# Patient Record
Sex: FEMALE | Race: OTHER | Employment: FULL TIME | URBAN - METROPOLITAN AREA
[De-identification: names, ages, dates, MRNs, and addresses within clinical notes are randomized per-mention and may not be internally consistent; named-entity substitution may affect disease eponyms.]

---

## 2019-10-28 ENCOUNTER — OFFICE VISIT (OUTPATIENT)
Dept: OBGYN CLINIC | Facility: CLINIC | Age: 34
End: 2019-10-28
Payer: COMMERCIAL

## 2019-10-28 ENCOUNTER — LAB ENCOUNTER (OUTPATIENT)
Dept: LAB | Age: 34
End: 2019-10-28
Attending: OBSTETRICS & GYNECOLOGY
Payer: COMMERCIAL

## 2019-10-28 VITALS
WEIGHT: 248 LBS | BODY MASS INDEX: 43.94 KG/M2 | HEIGHT: 63 IN | SYSTOLIC BLOOD PRESSURE: 136 MMHG | DIASTOLIC BLOOD PRESSURE: 88 MMHG | HEART RATE: 110 BPM

## 2019-10-28 DIAGNOSIS — N92.6 IRREGULAR MENSES: Primary | ICD-10-CM

## 2019-10-28 DIAGNOSIS — N92.6 IRREGULAR MENSES: ICD-10-CM

## 2019-10-28 DIAGNOSIS — Z23 NEED FOR VACCINATION: ICD-10-CM

## 2019-10-28 PROCEDURE — 90471 IMMUNIZATION ADMIN: CPT | Performed by: OBSTETRICS & GYNECOLOGY

## 2019-10-28 PROCEDURE — 83002 ASSAY OF GONADOTROPIN (LH): CPT | Performed by: OBSTETRICS & GYNECOLOGY

## 2019-10-28 PROCEDURE — 84402 ASSAY OF FREE TESTOSTERONE: CPT | Performed by: OBSTETRICS & GYNECOLOGY

## 2019-10-28 PROCEDURE — 82627 DEHYDROEPIANDROSTERONE: CPT | Performed by: OBSTETRICS & GYNECOLOGY

## 2019-10-28 PROCEDURE — 90686 IIV4 VACC NO PRSV 0.5 ML IM: CPT | Performed by: OBSTETRICS & GYNECOLOGY

## 2019-10-28 PROCEDURE — 99395 PREV VISIT EST AGE 18-39: CPT | Performed by: OBSTETRICS & GYNECOLOGY

## 2019-10-28 PROCEDURE — 88175 CYTOPATH C/V AUTO FLUID REDO: CPT | Performed by: OBSTETRICS & GYNECOLOGY

## 2019-10-28 PROCEDURE — 84146 ASSAY OF PROLACTIN: CPT | Performed by: OBSTETRICS & GYNECOLOGY

## 2019-10-28 PROCEDURE — 84443 ASSAY THYROID STIM HORMONE: CPT | Performed by: OBSTETRICS & GYNECOLOGY

## 2019-10-28 PROCEDURE — 87624 HPV HI-RISK TYP POOLED RSLT: CPT | Performed by: OBSTETRICS & GYNECOLOGY

## 2019-10-28 PROCEDURE — 82670 ASSAY OF TOTAL ESTRADIOL: CPT | Performed by: OBSTETRICS & GYNECOLOGY

## 2019-10-28 PROCEDURE — 84403 ASSAY OF TOTAL TESTOSTERONE: CPT | Performed by: OBSTETRICS & GYNECOLOGY

## 2019-10-28 PROCEDURE — 83001 ASSAY OF GONADOTROPIN (FSH): CPT | Performed by: OBSTETRICS & GYNECOLOGY

## 2019-10-28 PROCEDURE — 36415 COLL VENOUS BLD VENIPUNCTURE: CPT | Performed by: OBSTETRICS & GYNECOLOGY

## 2019-10-28 PROCEDURE — 83036 HEMOGLOBIN GLYCOSYLATED A1C: CPT | Performed by: OBSTETRICS & GYNECOLOGY

## 2019-10-28 RX ORDER — MELATONIN
400 DAILY
COMMUNITY

## 2019-10-28 RX ORDER — MULTIVIT-MIN/IRON/FOLIC ACID/K 18-600-40
CAPSULE ORAL
COMMUNITY

## 2019-10-28 NOTE — PROGRESS NOTES
GYN H&P     10/28/2019  1:32 PM    CC: Patient is here for annual exam and irregular bleeding    HPI: patient is a 30year old  here for her annual gyn exam.   She reports in 2018 her menses began to become irregular.  Also reports some weight gain a Gastrointestinal: Negative for abdominal pain, blood in stool and abdominal distention. Genitourinary: Positive for menstrual problem. Negative for bladder incontinence, urgency, vaginal bleeding, vaginal discharge, breast mass and breast pain.    All oth Psychiatric: She has a normal mood and affect. Her speech is normal.          A/P: Patient is 30year old female. Here for well woman exam.   1. Irregular menses    2.  Need for vaccination        Patient Active Problem List:     Irregular menses       Rashida

## 2019-10-29 PROBLEM — N92.6 IRREGULAR MENSES: Status: ACTIVE | Noted: 2019-10-29

## 2019-11-07 ENCOUNTER — ULTRASOUND ENCOUNTER (OUTPATIENT)
Dept: OBGYN CLINIC | Facility: CLINIC | Age: 34
End: 2019-11-07
Payer: COMMERCIAL

## 2019-12-02 ENCOUNTER — TELEPHONE (OUTPATIENT)
Dept: OBGYN CLINIC | Facility: CLINIC | Age: 34
End: 2019-12-02

## 2019-12-02 ENCOUNTER — OFFICE VISIT (OUTPATIENT)
Dept: OBGYN CLINIC | Facility: CLINIC | Age: 34
End: 2019-12-02
Payer: COMMERCIAL

## 2019-12-02 VITALS
HEIGHT: 63 IN | BODY MASS INDEX: 44.09 KG/M2 | SYSTOLIC BLOOD PRESSURE: 144 MMHG | DIASTOLIC BLOOD PRESSURE: 100 MMHG | WEIGHT: 248.81 LBS

## 2019-12-02 DIAGNOSIS — E28.2 PCOS (POLYCYSTIC OVARIAN SYNDROME): ICD-10-CM

## 2019-12-02 DIAGNOSIS — R30.0 DYSURIA: Primary | ICD-10-CM

## 2019-12-02 PROCEDURE — 81002 URINALYSIS NONAUTO W/O SCOPE: CPT | Performed by: OBSTETRICS & GYNECOLOGY

## 2019-12-02 PROCEDURE — 99212 OFFICE O/P EST SF 10 MIN: CPT | Performed by: OBSTETRICS & GYNECOLOGY

## 2019-12-02 PROCEDURE — 87086 URINE CULTURE/COLONY COUNT: CPT | Performed by: OBSTETRICS & GYNECOLOGY

## 2019-12-02 NOTE — PATIENT INSTRUCTIONS
Please start metformin once daily with dinner. Please send me a "[employer] message in 2 weeks and let me know how it is going. PLEASE HAVE YOUR  GET SEMEN ANALYSIS!!! We will consider Clomid or Letrozole once we have those results returned.

## 2019-12-02 NOTE — TELEPHONE ENCOUNTER
Pt gave wrong pharmacy and wondering if rx can be re-sent to different [facility]. Metformin. Corrected pharmacy in registration.

## 2019-12-02 NOTE — PROGRESS NOTES
GYN H&P     2019  2:35 PM    CC: Patient is here for f/u of PCOS    HPI: patient is a 30year old  here for f/u of PCOS  Pt was seen with infertility and h/o irregular cycles. Labs with A1C in pre-DM range and elevated testosterone.  US with PCO avoid further workup at this time. Encouraged PO hydration and reviewed precautions. Avoid high calcium foods  - URINALYSIS NONAUTO W/O SCOPE  - URINE CULTURE, ROUTINE; Future    2. PCOS: we reviewed today in length dx and potential consequences.  We review

## 2019-12-16 ENCOUNTER — TELEPHONE (OUTPATIENT)
Dept: OBGYN CLINIC | Facility: CLINIC | Age: 34
End: 2019-12-16

## 2019-12-16 NOTE — TELEPHONE ENCOUNTER
Regarding: RE:Doctor I might refer you to  Contact: [phone]  ----- Message from Delicia Fenton RN sent at 12/16/2019 11:59 AM CST -----       ----- Message from Margie Camarena to Alexia Cordoba MD sent at 12/15/2019  4:16 PM -----   Hi Dr. Linda Medel

## 2020-02-10 ENCOUNTER — PATIENT MESSAGE (OUTPATIENT)
Dept: OBGYN CLINIC | Facility: CLINIC | Age: 35
End: 2020-02-10

## 2020-04-20 NOTE — TELEPHONE ENCOUNTER
Laurie De León MD  [facility] Nurse 18 minutes ago (5:22 PM)      Oh yes we can refill. BID, 3 month suppply. Thank you! Routing comment      Refill sent.

## 2020-04-20 NOTE — TELEPHONE ENCOUNTER
Last OV: 12/2/19 with Dr. Sameer Gant for gyn problem  Last refill date: 2/11/20  Follow-up: after completing repeat SA for ? Next appt.: none scheduled      Will consult with Dr. Sameer Gant for plan of care.

## 2021-07-15 ENCOUNTER — TELEPHONE (OUTPATIENT)
Dept: OBGYN CLINIC | Facility: CLINIC | Age: 36
End: 2021-07-15

## 2021-07-15 NOTE — TELEPHONE ENCOUNTER
Patient calling to initiate prenatal care  LMP 6/15  Patient is 7-8 weeks   Confirmation Ultrasound and Appointment scheduled on   Future Appointments   Date Time Provider Ana Maria Beard   8/10/2021  2:45 PM [facility]   8/

## 2021-07-22 NOTE — TELEPHONE ENCOUNTER
LMP: 06/15/2021    EDC based on lmp: 2022    8 wks on 08/10/2021        Are cycles regular?: Since starting Metformin her periods regulated     Medical problems: HTN    Past sx hx: Cholecystectomy 2018    Current medications: Labetalol 100 MG BI

## 2021-08-10 ENCOUNTER — ULTRASOUND ENCOUNTER (OUTPATIENT)
Dept: OBGYN CLINIC | Facility: CLINIC | Age: 36
End: 2021-08-10
Payer: COMMERCIAL

## 2021-08-10 ENCOUNTER — OFFICE VISIT (OUTPATIENT)
Dept: OBGYN CLINIC | Facility: CLINIC | Age: 36
End: 2021-08-10
Payer: COMMERCIAL

## 2021-08-10 VITALS
BODY MASS INDEX: 39.37 KG/M2 | HEIGHT: 63 IN | SYSTOLIC BLOOD PRESSURE: 124 MMHG | HEART RATE: 126 BPM | DIASTOLIC BLOOD PRESSURE: 72 MMHG | WEIGHT: 222.19 LBS

## 2021-08-10 DIAGNOSIS — N91.1 SECONDARY AMENORRHEA: Primary | ICD-10-CM

## 2021-08-10 DIAGNOSIS — O41.8X90 SUBCHORIONIC HEMATOMA, ANTEPARTUM, SINGLE OR UNSPECIFIED FETUS: ICD-10-CM

## 2021-08-10 DIAGNOSIS — Z32.01 PREGNANCY EXAMINATION OR TEST, POSITIVE RESULT: ICD-10-CM

## 2021-08-10 DIAGNOSIS — O46.8X9 SUBCHORIONIC HEMATOMA, ANTEPARTUM, SINGLE OR UNSPECIFIED FETUS: ICD-10-CM

## 2021-08-10 PROCEDURE — 76830 TRANSVAGINAL US NON-OB: CPT | Performed by: OBSTETRICS & GYNECOLOGY

## 2021-08-10 PROCEDURE — 99213 OFFICE O/P EST LOW 20 MIN: CPT | Performed by: OBSTETRICS & GYNECOLOGY

## 2021-08-10 PROCEDURE — 3078F DIAST BP <80 MM HG: CPT | Performed by: OBSTETRICS & GYNECOLOGY

## 2021-08-10 PROCEDURE — 3008F BODY MASS INDEX DOCD: CPT | Performed by: OBSTETRICS & GYNECOLOGY

## 2021-08-10 PROCEDURE — 3074F SYST BP LT 130 MM HG: CPT | Performed by: OBSTETRICS & GYNECOLOGY

## 2021-08-10 RX ORDER — VITAMIN E 268 MG
1000 CAPSULE ORAL DAILY
COMMUNITY
End: 2021-12-28

## 2021-08-10 RX ORDER — MAGNESIUM OXIDE 400 MG (241.3 MG MAGNESIUM) TABLET
400 TABLET DAILY
COMMUNITY

## 2021-08-10 RX ORDER — METFORMIN HYDROCHLORIDE 750 MG/1
750 TABLET, EXTENDED RELEASE ORAL 2 TIMES DAILY WITH MEALS
COMMUNITY
Start: 2021-07-09 | End: 2021-10-06

## 2021-08-10 RX ORDER — LABETALOL 100 MG/1
100 TABLET, FILM COATED ORAL 2 TIMES DAILY
COMMUNITY
Start: 2021-08-02 | End: 2021-11-24

## 2021-08-10 NOTE — PROGRESS NOTES
[facility]  Obstetrics and Gynecology    Subjective:     Kelsea Kunz is a 31year old  female presents with c/o secondary amenorrhea and positive pregnancy test. The patient was recommended to return for further evaluation and a pelvic US over a lot of false information the circulating in the web, and I tried to explain scientific reasons why they are false. She appreciated the conversation. All of the findings and plan were discussed with the patient.   She expressed understanding and a

## 2021-09-08 ENCOUNTER — ULTRASOUND ENCOUNTER (OUTPATIENT)
Dept: OBGYN CLINIC | Facility: CLINIC | Age: 36
End: 2021-09-08
Payer: COMMERCIAL

## 2021-09-08 ENCOUNTER — INITIAL PRENATAL (OUTPATIENT)
Dept: OBGYN CLINIC | Facility: CLINIC | Age: 36
End: 2021-09-08
Payer: COMMERCIAL

## 2021-09-08 ENCOUNTER — LAB ENCOUNTER (OUTPATIENT)
Dept: LAB | Age: 36
End: 2021-09-08
Attending: OBSTETRICS & GYNECOLOGY
Payer: COMMERCIAL

## 2021-09-08 VITALS
HEART RATE: 95 BPM | BODY MASS INDEX: 39.8 KG/M2 | WEIGHT: 224.63 LBS | DIASTOLIC BLOOD PRESSURE: 70 MMHG | SYSTOLIC BLOOD PRESSURE: 110 MMHG | HEIGHT: 63 IN

## 2021-09-08 DIAGNOSIS — O99.210 OBESITY AFFECTING PREGNANCY, ANTEPARTUM: ICD-10-CM

## 2021-09-08 DIAGNOSIS — Z34.90 SUPERVISION OF NORMAL PREGNANCY: Primary | ICD-10-CM

## 2021-09-08 DIAGNOSIS — Z34.90 PRENATAL CARE, ANTEPARTUM: Primary | ICD-10-CM

## 2021-09-08 DIAGNOSIS — Z34.90 PRENATAL CARE, ANTEPARTUM: ICD-10-CM

## 2021-09-08 LAB
ANTIBODY SCREEN: NEGATIVE
BASOPHILS # BLD AUTO: 0.02 X10(3) UL (ref 0–0.2)
BASOPHILS NFR BLD AUTO: 0.2 %
EOSINOPHIL # BLD AUTO: 0.09 X10(3) UL (ref 0–0.7)
EOSINOPHIL NFR BLD AUTO: 0.9 %
ERYTHROCYTE [DISTWIDTH] IN BLOOD BY AUTOMATED COUNT: 13.3 %
EST. AVERAGE GLUCOSE BLD GHB EST-MCNC: 105 MG/DL (ref 68–126)
GLUCOSE (URINE DIPSTICK): NEGATIVE MG/DL
GLUCOSE 1H P GLC SERPL-MCNC: 171 MG/DL
HBA1C MFR BLD HPLC: 5.3 % (ref ?–5.7)
HBV SURFACE AG SER-ACNC: <0.1 [IU]/L
HBV SURFACE AG SERPL QL IA: NONREACTIVE
HCT VFR BLD AUTO: 37.1 %
HGB BLD-MCNC: 12.2 G/DL
IMM GRANULOCYTES # BLD AUTO: 0.04 X10(3) UL (ref 0–1)
IMM GRANULOCYTES NFR BLD: 0.4 %
LYMPHOCYTES # BLD AUTO: 1.78 X10(3) UL (ref 1–4)
LYMPHOCYTES NFR BLD AUTO: 17.2 %
MCH RBC QN AUTO: 28 PG (ref 26–34)
MCHC RBC AUTO-ENTMCNC: 32.9 G/DL (ref 31–37)
MCV RBC AUTO: 85.3 FL
MONOCYTES # BLD AUTO: 0.4 X10(3) UL (ref 0.1–1)
MONOCYTES NFR BLD AUTO: 3.9 %
MULTISTIX LOT#: ABNORMAL NUMERIC
NEUTROPHILS # BLD AUTO: 8.03 X10 (3) UL (ref 1.5–7.7)
NEUTROPHILS # BLD AUTO: 8.03 X10(3) UL (ref 1.5–7.7)
NEUTROPHILS NFR BLD AUTO: 77.4 %
PLATELET # BLD AUTO: 358 10(3)UL (ref 150–450)
PROTEIN (URINE DIPSTICK): 30 MG/DL
RBC # BLD AUTO: 4.35 X10(6)UL
RH BLOOD TYPE: POSITIVE
RUBV IGG SER-ACNC: 7.8 IU/ML (ref 10–?)
T PALLIDUM AB SER QL IA: NONREACTIVE
WBC # BLD AUTO: 10.4 X10(3) UL (ref 4–11)

## 2021-09-08 PROCEDURE — 87491 CHLMYD TRACH DNA AMP PROBE: CPT | Performed by: OBSTETRICS & GYNECOLOGY

## 2021-09-08 PROCEDURE — 86900 BLOOD TYPING SEROLOGIC ABO: CPT | Performed by: OBSTETRICS & GYNECOLOGY

## 2021-09-08 PROCEDURE — 86762 RUBELLA ANTIBODY: CPT | Performed by: OBSTETRICS & GYNECOLOGY

## 2021-09-08 PROCEDURE — 87591 N.GONORRHOEAE DNA AMP PROB: CPT | Performed by: OBSTETRICS & GYNECOLOGY

## 2021-09-08 PROCEDURE — 81002 URINALYSIS NONAUTO W/O SCOPE: CPT | Performed by: OBSTETRICS & GYNECOLOGY

## 2021-09-08 PROCEDURE — 86850 RBC ANTIBODY SCREEN: CPT | Performed by: OBSTETRICS & GYNECOLOGY

## 2021-09-08 PROCEDURE — 85025 COMPLETE CBC W/AUTO DIFF WBC: CPT | Performed by: OBSTETRICS & GYNECOLOGY

## 2021-09-08 PROCEDURE — 81220 CFTR GENE COM VARIANTS: CPT | Performed by: OBSTETRICS & GYNECOLOGY

## 2021-09-08 PROCEDURE — 86780 TREPONEMA PALLIDUM: CPT | Performed by: OBSTETRICS & GYNECOLOGY

## 2021-09-08 PROCEDURE — 3078F DIAST BP <80 MM HG: CPT | Performed by: OBSTETRICS & GYNECOLOGY

## 2021-09-08 PROCEDURE — 86901 BLOOD TYPING SEROLOGIC RH(D): CPT | Performed by: OBSTETRICS & GYNECOLOGY

## 2021-09-08 PROCEDURE — 83036 HEMOGLOBIN GLYCOSYLATED A1C: CPT | Performed by: OBSTETRICS & GYNECOLOGY

## 2021-09-08 PROCEDURE — 87340 HEPATITIS B SURFACE AG IA: CPT | Performed by: OBSTETRICS & GYNECOLOGY

## 2021-09-08 PROCEDURE — 87389 HIV-1 AG W/HIV-1&-2 AB AG IA: CPT | Performed by: OBSTETRICS & GYNECOLOGY

## 2021-09-08 PROCEDURE — 3074F SYST BP LT 130 MM HG: CPT | Performed by: OBSTETRICS & GYNECOLOGY

## 2021-09-08 PROCEDURE — 3008F BODY MASS INDEX DOCD: CPT | Performed by: OBSTETRICS & GYNECOLOGY

## 2021-09-08 PROCEDURE — 82950 GLUCOSE TEST: CPT | Performed by: OBSTETRICS & GYNECOLOGY

## 2021-09-08 PROCEDURE — 87086 URINE CULTURE/COLONY COUNT: CPT | Performed by: OBSTETRICS & GYNECOLOGY

## 2021-09-08 NOTE — PROGRESS NOTES
[facility]  Obstetrics and Gynecology  History & Physical    CC: Patient is here to establish prenatal care     Subjective:     HPI:  Artice All is a 31year old  female at 12w1d who presents today to establish prenatal care.  Patient substance abuse and pets.   - Counseled on taking a PNV with 6.5HL of folic acid   - Limiting intake of alcohol, coffee, tea, soda, and other foods containing caffeine  - Limit intake of fish to twice weekly to limit mercury exposure        RTC in 4 wks

## 2021-09-09 LAB
C TRACH DNA SPEC QL NAA+PROBE: NEGATIVE
N GONORRHOEA DNA SPEC QL NAA+PROBE: NEGATIVE

## 2021-09-13 ENCOUNTER — TELEPHONE (OUTPATIENT)
Dept: OBGYN CLINIC | Facility: CLINIC | Age: 36
End: 2021-09-13

## 2021-09-13 NOTE — TELEPHONE ENCOUNTER
Normal MaterniT 21 results. Gender noted to be female. Patient notified of all findings and she did want to know gender.

## 2021-09-14 ENCOUNTER — MED REC SCAN ONLY (OUTPATIENT)
Dept: OBGYN CLINIC | Facility: CLINIC | Age: 36
End: 2021-09-14

## 2021-09-15 LAB
CYSTIC FIBROSIS ALLELE 1: NEGATIVE
CYSTIC FIBROSIS, ALLELE 2: NEGATIVE

## 2021-09-25 ENCOUNTER — LABORATORY ENCOUNTER (OUTPATIENT)
Dept: LAB | Facility: HOSPITAL | Age: 36
End: 2021-09-25
Attending: OBSTETRICS & GYNECOLOGY
Payer: COMMERCIAL

## 2021-09-25 DIAGNOSIS — O99.810 ABNORMAL MATERNAL GLUCOSE TOLERANCE, ANTEPARTUM: ICD-10-CM

## 2021-09-25 LAB
1 HR GLUCOSE GESTATIONAL: 184 MG/DL
GLUCOSE 1H P GLC SERPL-MCNC: 118 MG/DL
GLUCOSE 3H P GLC SERPL-MCNC: 61 MG/DL
GLUCOSE BLD-MCNC: 98 MG/DL (ref 70–99)
GLUCOSE P FAST SERPL-MCNC: 91 MG/DL

## 2021-09-25 PROCEDURE — 36415 COLL VENOUS BLD VENIPUNCTURE: CPT

## 2021-09-25 PROCEDURE — 82951 GLUCOSE TOLERANCE TEST (GTT): CPT

## 2021-09-25 PROCEDURE — 82952 GTT-ADDED SAMPLES: CPT

## 2021-09-25 PROCEDURE — 82962 GLUCOSE BLOOD TEST: CPT

## 2021-10-06 ENCOUNTER — ROUTINE PRENATAL (OUTPATIENT)
Dept: OBGYN CLINIC | Facility: CLINIC | Age: 36
End: 2021-10-06
Payer: COMMERCIAL

## 2021-10-06 VITALS — BODY MASS INDEX: 40 KG/M2 | SYSTOLIC BLOOD PRESSURE: 116 MMHG | WEIGHT: 225 LBS | DIASTOLIC BLOOD PRESSURE: 70 MMHG

## 2021-10-06 DIAGNOSIS — E28.2 PCOS (POLYCYSTIC OVARIAN SYNDROME): ICD-10-CM

## 2021-10-06 DIAGNOSIS — O99.210 OBESITY AFFECTING PREGNANCY, ANTEPARTUM: ICD-10-CM

## 2021-10-06 DIAGNOSIS — Z23 NEED FOR VACCINATION: Primary | ICD-10-CM

## 2021-10-06 DIAGNOSIS — Z34.02 ENCOUNTER FOR SUPERVISION OF NORMAL FIRST PREGNANCY IN SECOND TRIMESTER: ICD-10-CM

## 2021-10-06 DIAGNOSIS — I10 CHRONIC HYPERTENSION: ICD-10-CM

## 2021-10-06 PROCEDURE — 90686 IIV4 VACC NO PRSV 0.5 ML IM: CPT | Performed by: NURSE PRACTITIONER

## 2021-10-06 PROCEDURE — 82570 ASSAY OF URINE CREATININE: CPT | Performed by: NURSE PRACTITIONER

## 2021-10-06 PROCEDURE — 3074F SYST BP LT 130 MM HG: CPT | Performed by: NURSE PRACTITIONER

## 2021-10-06 PROCEDURE — 80053 COMPREHEN METABOLIC PANEL: CPT | Performed by: NURSE PRACTITIONER

## 2021-10-06 PROCEDURE — 90471 IMMUNIZATION ADMIN: CPT | Performed by: NURSE PRACTITIONER

## 2021-10-06 PROCEDURE — 3078F DIAST BP <80 MM HG: CPT | Performed by: NURSE PRACTITIONER

## 2021-10-06 PROCEDURE — 82105 ALPHA-FETOPROTEIN SERUM: CPT | Performed by: NURSE PRACTITIONER

## 2021-10-06 PROCEDURE — 84156 ASSAY OF PROTEIN URINE: CPT | Performed by: NURSE PRACTITIONER

## 2021-10-06 NOTE — PROGRESS NOTES
HARINDER  Doing well, no concerns  No complaints. No LOF/VB/uctx  MS AFP, CMP, and urine for random protein and creatinine ratio ordered  Covid vaccine discussed, encouraged. They are concerned about their daughters fertility.  Advised there isn't any substantia

## 2021-11-03 ENCOUNTER — OFFICE VISIT (OUTPATIENT)
Dept: PERINATAL CARE | Facility: HOSPITAL | Age: 36
End: 2021-11-03
Attending: NURSE PRACTITIONER
Payer: COMMERCIAL

## 2021-11-03 ENCOUNTER — ULTRASOUND ENCOUNTER (OUTPATIENT)
Dept: PERINATAL CARE | Facility: HOSPITAL | Age: 36
End: 2021-11-03
Attending: OBSTETRICS & GYNECOLOGY
Payer: COMMERCIAL

## 2021-11-03 ENCOUNTER — ROUTINE PRENATAL (OUTPATIENT)
Dept: OBGYN CLINIC | Facility: CLINIC | Age: 36
End: 2021-11-03
Payer: COMMERCIAL

## 2021-11-03 VITALS
HEART RATE: 88 BPM | WEIGHT: 224 LBS | DIASTOLIC BLOOD PRESSURE: 85 MMHG | BODY MASS INDEX: 40 KG/M2 | SYSTOLIC BLOOD PRESSURE: 132 MMHG

## 2021-11-03 VITALS — WEIGHT: 230 LBS | DIASTOLIC BLOOD PRESSURE: 60 MMHG | SYSTOLIC BLOOD PRESSURE: 124 MMHG | BODY MASS INDEX: 41 KG/M2

## 2021-11-03 DIAGNOSIS — O35.9XX0 FETAL ABNORMALITY AFFECTING MANAGEMENT OF MOTHER, SINGLE OR UNSPECIFIED FETUS: ICD-10-CM

## 2021-11-03 DIAGNOSIS — Z36.9 ANTENATAL SCREENING ENCOUNTER: ICD-10-CM

## 2021-11-03 DIAGNOSIS — O99.212 OBESITY AFFECTING PREGNANCY IN SECOND TRIMESTER: ICD-10-CM

## 2021-11-03 DIAGNOSIS — I10 CHRONIC HYPERTENSION: Primary | ICD-10-CM

## 2021-11-03 DIAGNOSIS — O99.210 OBESITY AFFECTING PREGNANCY, ANTEPARTUM: ICD-10-CM

## 2021-11-03 DIAGNOSIS — I10 CHRONIC HYPERTENSION: ICD-10-CM

## 2021-11-03 DIAGNOSIS — E66.9 CLASS 2 OBESITY WITH BODY MASS INDEX (BMI) OF 39.0 TO 39.9 IN ADULT, UNSPECIFIED OBESITY TYPE, UNSPECIFIED WHETHER SERIOUS COMORBIDITY PRESENT: ICD-10-CM

## 2021-11-03 DIAGNOSIS — Z34.90 PRENATAL CARE, ANTEPARTUM: Primary | ICD-10-CM

## 2021-11-03 PROCEDURE — 76811 OB US DETAILED SNGL FETUS: CPT | Performed by: OBSTETRICS & GYNECOLOGY

## 2021-11-03 PROCEDURE — 3078F DIAST BP <80 MM HG: CPT | Performed by: OBSTETRICS & GYNECOLOGY

## 2021-11-03 PROCEDURE — 99243 OFF/OP CNSLTJ NEW/EST LOW 30: CPT | Performed by: OBSTETRICS & GYNECOLOGY

## 2021-11-03 PROCEDURE — 3074F SYST BP LT 130 MM HG: CPT | Performed by: OBSTETRICS & GYNECOLOGY

## 2021-11-03 PROCEDURE — 81002 URINALYSIS NONAUTO W/O SCOPE: CPT | Performed by: OBSTETRICS & GYNECOLOGY

## 2021-11-03 RX ORDER — ASPIRIN 81 MG/1
121 TABLET, CHEWABLE ORAL DAILY
COMMUNITY

## 2021-11-03 NOTE — PROGRESS NOTES
Pt here for Level II Ultrasound/CHTN/PCOS/Obesity  +flutters noted per patient. Slight sciatic pain noted per patient, pt denies further complaints.

## 2021-11-03 NOTE — PROGRESS NOTES
Patient presents with:  Prenatal Care    Routine prenatal visit. Patient without complaints. Good fetal movement. Patient denies any bleeding, leaking fluid, cramping, or contractions. Assessment/Plan:  20w1d doing well  1 hr GTT and CBC next.   Blood t

## 2021-11-03 NOTE — PROGRESS NOTES
Outpatient Maternal-Fetal Medicine Consultation    Dear Dr. Kalani Huitron    Thank you for requesting ultrasound evaluation and maternal fetal medicine consultation on your patient Merline Lozano.   As you are aware she is a 31year old female  with a single Component Value Date    GLUFG 91 09/25/2021    GLU1G 184 (H) 09/25/2021    GLU2G 118 09/25/2021    GLU3G 61 09/25/2021        OBSTETRIC ULTRASOUND  The patient had a level II ultrasound today which revealed size consistent with dates with pyelectasis but ovulatory women, increasing obesity is associated with decreasing spontaneous pregnancy rates. The increased risk of miscarriage in obese women may be because such women often have PCOS or isolated insulin resistance.                  Due to its strong ass times, wound infection, thromboembolism, and endometritis.             Maternal obesity appears to be associated with a small increase in the absolute rate of some congenital anomalies (primarily neural tube defect and cardiac), and the risk may increase wi high risk of preeclampsia in whom the benefits outweigh the risks. Clinical Guidelines  Based on the available data, low-dose aspirin is advised for women at high risk for preeclampsia. There is no consensus on the criteria that confer high risk.  The U is altered by higher dosing. Hence, current evidence has suggested a daily dose of 100 to 150 mg of aspirin rather than a lower dose.  In the [address], this can be achieved by taking one and one-half 81 mg tablets; however, taking one 81 mg tablet is also reasona discussed the potential implications associated with chronic hypertension.    I informed the patient that chronic hypertension increases the risk of pre-eclampsia, placental abruption, IUGR, fetal birth defects (cardiac and neural tube), and fetal demise an Studies that defined pyelectasis as renal pelvic diameter of ?4 mm in the second trimester and is found pyelectasis in 10 to 25 percent of fetuses with Down syndrome and 2-3% percent of euploid fetuses.  Aneuploidy is present in 0.3 to 0.9 percent of fetuse the care of your patient. Please do not hesitate to contact me if additional questions or concerns arise. Bryant Gomes M.D. The majority of the time (>50%) was spent in review of records, consultation and coordination of care.   Our discussion is s

## 2021-11-24 ENCOUNTER — PATIENT MESSAGE (OUTPATIENT)
Dept: OBGYN CLINIC | Facility: CLINIC | Age: 36
End: 2021-11-24

## 2021-11-24 RX ORDER — LABETALOL 100 MG/1
100 TABLET, FILM COATED ORAL 2 TIMES DAILY
Qty: 180 TABLET | Refills: 0 | Status: SHIPPED | OUTPATIENT
Start: 2021-11-24

## 2021-11-24 NOTE — TELEPHONE ENCOUNTER
From: Teddy Smith  To: Fang White MD  Sent: 11/24/2021 6:10 AM CST  Subject: Refill    Good morning,   Could you send over a refill for my blood pressure medication.  My primary wanted the ob to take it over, in case it the dosage needed to me altere

## 2021-12-01 PROBLEM — Z34.90 PREGNANCY: Status: ACTIVE | Noted: 2021-12-01

## 2021-12-02 ENCOUNTER — ROUTINE PRENATAL (OUTPATIENT)
Dept: OBGYN CLINIC | Facility: CLINIC | Age: 36
End: 2021-12-02
Payer: COMMERCIAL

## 2021-12-02 ENCOUNTER — LAB ENCOUNTER (OUTPATIENT)
Dept: LAB | Age: 36
End: 2021-12-02
Attending: OBSTETRICS & GYNECOLOGY
Payer: COMMERCIAL

## 2021-12-02 VITALS — BODY MASS INDEX: 41 KG/M2 | SYSTOLIC BLOOD PRESSURE: 110 MMHG | WEIGHT: 232.81 LBS | DIASTOLIC BLOOD PRESSURE: 70 MMHG

## 2021-12-02 DIAGNOSIS — Z34.90 PRENATAL CARE, ANTEPARTUM: Primary | ICD-10-CM

## 2021-12-02 DIAGNOSIS — Z36.9 ANTENATAL SCREENING ENCOUNTER: ICD-10-CM

## 2021-12-02 DIAGNOSIS — O99.810 ABNORMAL GLUCOSE TOLERANCE TEST (GTT) DURING PREGNANCY, ANTEPARTUM: Primary | ICD-10-CM

## 2021-12-02 PROCEDURE — 82950 GLUCOSE TEST: CPT | Performed by: OBSTETRICS & GYNECOLOGY

## 2021-12-02 PROCEDURE — 3078F DIAST BP <80 MM HG: CPT | Performed by: OBSTETRICS & GYNECOLOGY

## 2021-12-02 PROCEDURE — 81002 URINALYSIS NONAUTO W/O SCOPE: CPT | Performed by: OBSTETRICS & GYNECOLOGY

## 2021-12-02 PROCEDURE — 85025 COMPLETE CBC W/AUTO DIFF WBC: CPT | Performed by: OBSTETRICS & GYNECOLOGY

## 2021-12-02 PROCEDURE — 3074F SYST BP LT 130 MM HG: CPT | Performed by: OBSTETRICS & GYNECOLOGY

## 2021-12-02 NOTE — PATIENT INSTRUCTIONS
LABOR & DELIVERY PRE-REGISTRATION    Thank you for choosing [facility] for you labor and delivery needs. We look forward to caring for you and your family in this exciting time.   In order to better care for all of our patients, [facility] re CHART    Although not all women need to perform daily fetal movement counts, if you notice a decrease in fetal activity, you should contact our office immediately. Begin counting fetal movements at 32 weeks of pregnancy.   You may find that your baby i that all pregnant women receive a Tdap vaccine during pregnancy, regardless of whether you have received one previously.   The vaccine reduces your chances of audelia the illness, and also provides some natural immunity to your baby for possible exposur Covid-19 infection and have a subsequent higher risk for the following:   - severe illness   - adverse pregnancy outcomes including  birth   - hospitalization, ICU admission, need for mechanical ventilation and death  [2] The mRNA vaccines that are

## 2021-12-02 NOTE — PROGRESS NOTES
Patient presents with:  Prenatal Care: HARINDER- had 2nd dose of covid vaccine sunday    Routine prenatal visit. Patient without complaints. Good fetal movement  Patient denies any bleeding, leaking fluid, cramping, or contractions.     Assessment/Plan:  24w2d

## 2021-12-09 ENCOUNTER — LABORATORY ENCOUNTER (OUTPATIENT)
Dept: LAB | Facility: HOSPITAL | Age: 36
End: 2021-12-09
Attending: OBSTETRICS & GYNECOLOGY
Payer: COMMERCIAL

## 2021-12-09 DIAGNOSIS — O99.810 ABNORMAL GLUCOSE TOLERANCE TEST (GTT) DURING PREGNANCY, ANTEPARTUM: ICD-10-CM

## 2021-12-09 PROBLEM — R73.09 ABNORMAL GLUCOSE TOLERANCE TEST (GTT): Status: ACTIVE | Noted: 2021-12-09

## 2021-12-09 PROCEDURE — 82962 GLUCOSE BLOOD TEST: CPT

## 2021-12-09 PROCEDURE — 82952 GTT-ADDED SAMPLES: CPT

## 2021-12-09 PROCEDURE — 82951 GLUCOSE TOLERANCE TEST (GTT): CPT

## 2021-12-09 PROCEDURE — 36415 COLL VENOUS BLD VENIPUNCTURE: CPT

## 2021-12-15 ENCOUNTER — ULTRASOUND ENCOUNTER (OUTPATIENT)
Dept: PERINATAL CARE | Facility: HOSPITAL | Age: 36
End: 2021-12-15
Attending: OBSTETRICS & GYNECOLOGY
Payer: COMMERCIAL

## 2021-12-15 VITALS
SYSTOLIC BLOOD PRESSURE: 130 MMHG | HEART RATE: 104 BPM | BODY MASS INDEX: 42.17 KG/M2 | HEIGHT: 63 IN | WEIGHT: 238 LBS | DIASTOLIC BLOOD PRESSURE: 74 MMHG

## 2021-12-15 DIAGNOSIS — O99.213 OBESITY AFFECTING PREGNANCY IN THIRD TRIMESTER: ICD-10-CM

## 2021-12-15 DIAGNOSIS — I10 CHRONIC HYPERTENSION: Primary | ICD-10-CM

## 2021-12-15 DIAGNOSIS — E66.01 MORBID OBESITY WITH BMI OF 40.0-44.9, ADULT (HCC): ICD-10-CM

## 2021-12-15 DIAGNOSIS — I10 CHRONIC HYPERTENSION: ICD-10-CM

## 2021-12-15 PROCEDURE — 99212 OFFICE O/P EST SF 10 MIN: CPT | Performed by: OBSTETRICS & GYNECOLOGY

## 2021-12-15 PROCEDURE — 76816 OB US FOLLOW-UP PER FETUS: CPT | Performed by: OBSTETRICS & GYNECOLOGY

## 2021-12-15 NOTE — PROGRESS NOTES
Brice Jenkins    Dear Dr. Baylee Granado    Thank you for requesting ultrasound evaluation and maternal fetal medicine consultation on your patient Micaela Altamirano.   As you are aware she is a 31year old female  with a almanza Report  I interpreted the results and reviewed them with the patient.     DISCUSSION  During her visit we discussed and reviewed the following issues:  Obesity: Please see previous MFM detailed discussion.         HYPERTENSION  Patient Review  The patient h medication adjustments  ·     Thank you for allowing me to participate in the care of your patient. Please do not hesitate to contact me if additional questions or concerns arise.       Kimi Lee MD, M.D.     20 minutes spent in review of records, docum

## 2021-12-28 ENCOUNTER — ROUTINE PRENATAL (OUTPATIENT)
Dept: OBGYN CLINIC | Facility: CLINIC | Age: 36
End: 2021-12-28
Payer: COMMERCIAL

## 2021-12-28 VITALS
HEART RATE: 101 BPM | SYSTOLIC BLOOD PRESSURE: 118 MMHG | WEIGHT: 239.19 LBS | DIASTOLIC BLOOD PRESSURE: 72 MMHG | BODY MASS INDEX: 42 KG/M2

## 2021-12-28 DIAGNOSIS — Z34.90 PRENATAL CARE, ANTEPARTUM: Primary | ICD-10-CM

## 2021-12-28 DIAGNOSIS — T14.8XXA BRUISING: ICD-10-CM

## 2021-12-28 DIAGNOSIS — Z23 NEED FOR VACCINATION: ICD-10-CM

## 2021-12-28 DIAGNOSIS — Z36.9 ANTENATAL SCREENING ENCOUNTER: ICD-10-CM

## 2021-12-28 PROCEDURE — 90715 TDAP VACCINE 7 YRS/> IM: CPT | Performed by: OBSTETRICS & GYNECOLOGY

## 2021-12-28 PROCEDURE — 3074F SYST BP LT 130 MM HG: CPT | Performed by: OBSTETRICS & GYNECOLOGY

## 2021-12-28 PROCEDURE — 3078F DIAST BP <80 MM HG: CPT | Performed by: OBSTETRICS & GYNECOLOGY

## 2021-12-28 PROCEDURE — 90471 IMMUNIZATION ADMIN: CPT | Performed by: OBSTETRICS & GYNECOLOGY

## 2021-12-28 PROCEDURE — 81002 URINALYSIS NONAUTO W/O SCOPE: CPT | Performed by: OBSTETRICS & GYNECOLOGY

## 2021-12-28 NOTE — PROGRESS NOTES
Patient presents with:  Prenatal Care    Routine prenatal visit. Patient complains of small bruise on both forearms with no trauma. Good fetal movement  Patient denies any bleeding, leaking fluid, cramping, or contractions.     Assessment/Plan:  28w0d doi

## 2022-01-10 ENCOUNTER — PATIENT MESSAGE (OUTPATIENT)
Dept: OBGYN CLINIC | Facility: CLINIC | Age: 37
End: 2022-01-10

## 2022-01-11 ENCOUNTER — TELEPHONE (OUTPATIENT)
Dept: OBGYN CLINIC | Facility: CLINIC | Age: 37
End: 2022-01-11

## 2022-01-11 NOTE — TELEPHONE ENCOUNTER
Received FMLA from [employer] [facility]. Printed from patient Front Desk [employer]. Patient pd $25.00 over phone. Paperwork due by 1/25. Please fax when complete.    Placed in nurse bin in [place] for completion

## 2022-01-11 NOTE — PROGRESS NOTES
[facility]    Dear Dr. Gene Odom    Thank you for requesting ultrasound evaluation and maternal fetal medicine consultation on your patient Sandy Torres.   As you are aware she is a 31year old female  with a singlet patient understands that ultrasound cannot rule out all structural and chromosomal abnormalities. See PACS/Imaging Tab For Complete Ultrasound Report  I interpreted the results and reviewed them with the patient.     DISCUSSION  During her visit we discus resulting in hydronephrosis. Hydronephrosis in these cases is thought to be caused by partial obstruction because complete obstruction results in rapid destruction of the kidney.  Partial obstruction can lead to progressive deterioration of renal function i language may contain abbreviations or verbiage that are technical and unfamiliar. It may appear blunt or direct. Medical documents are intended to carry relevant information, facts as evident, and the clinical opinion of the practitioner.

## 2022-01-11 NOTE — TELEPHONE ENCOUNTER
From: Milo Yepez  To: Brianna Ramirez MD  Sent: 1/10/2022 2:31 PM CST  Subject: FMLA paperwork    My company is requesting this back by 1/25/2022. I just received it today.     Thank you for your assistance

## 2022-01-12 ENCOUNTER — LAB ENCOUNTER (OUTPATIENT)
Dept: LAB | Age: 37
End: 2022-01-12
Attending: OBSTETRICS & GYNECOLOGY
Payer: COMMERCIAL

## 2022-01-12 ENCOUNTER — ROUTINE PRENATAL (OUTPATIENT)
Dept: OBGYN CLINIC | Facility: CLINIC | Age: 37
End: 2022-01-12
Payer: COMMERCIAL

## 2022-01-12 ENCOUNTER — OFFICE VISIT (OUTPATIENT)
Dept: PERINATAL CARE | Facility: HOSPITAL | Age: 37
End: 2022-01-12
Attending: OBSTETRICS & GYNECOLOGY
Payer: COMMERCIAL

## 2022-01-12 VITALS
BODY MASS INDEX: 42.52 KG/M2 | WEIGHT: 240 LBS | SYSTOLIC BLOOD PRESSURE: 120 MMHG | DIASTOLIC BLOOD PRESSURE: 70 MMHG | HEIGHT: 63 IN

## 2022-01-12 VITALS
DIASTOLIC BLOOD PRESSURE: 73 MMHG | SYSTOLIC BLOOD PRESSURE: 115 MMHG | WEIGHT: 239 LBS | BODY MASS INDEX: 42 KG/M2 | HEART RATE: 109 BPM

## 2022-01-12 DIAGNOSIS — Z36.9 PRENATAL SCREENING ENCOUNTER: Primary | ICD-10-CM

## 2022-01-12 DIAGNOSIS — E66.01 MORBID OBESITY WITH BMI OF 40.0-44.9, ADULT (HCC): ICD-10-CM

## 2022-01-12 DIAGNOSIS — O35.9XX0 FETAL ABNORMALITY AFFECTING MANAGEMENT OF MOTHER, SINGLE OR UNSPECIFIED FETUS: ICD-10-CM

## 2022-01-12 DIAGNOSIS — E66.01 MATERNAL MORBID OBESITY, ANTEPARTUM (HCC): ICD-10-CM

## 2022-01-12 DIAGNOSIS — O99.210 OBESITY AFFECTING PREGNANCY, ANTEPARTUM: ICD-10-CM

## 2022-01-12 DIAGNOSIS — Z36.9 ANTENATAL SCREENING ENCOUNTER: ICD-10-CM

## 2022-01-12 DIAGNOSIS — O99.210 MATERNAL MORBID OBESITY, ANTEPARTUM (HCC): ICD-10-CM

## 2022-01-12 DIAGNOSIS — I10 CHRONIC HYPERTENSION: Primary | ICD-10-CM

## 2022-01-12 DIAGNOSIS — I10 CHRONIC HYPERTENSION: ICD-10-CM

## 2022-01-12 DIAGNOSIS — T14.8XXA BRUISING: ICD-10-CM

## 2022-01-12 DIAGNOSIS — O99.213 OBESITY AFFECTING PREGNANCY IN THIRD TRIMESTER: ICD-10-CM

## 2022-01-12 LAB
BASOPHILS # BLD AUTO: 0.02 X10(3) UL (ref 0–0.2)
BASOPHILS NFR BLD AUTO: 0.2 %
EOSINOPHIL # BLD AUTO: 0.07 X10(3) UL (ref 0–0.7)
EOSINOPHIL NFR BLD AUTO: 0.5 %
ERYTHROCYTE [DISTWIDTH] IN BLOOD BY AUTOMATED COUNT: 14.8 %
GLUCOSE (URINE DIPSTICK): NEGATIVE MG/DL
HCT VFR BLD AUTO: 36.6 %
HGB BLD-MCNC: 11.4 G/DL
IMM GRANULOCYTES # BLD AUTO: 0.06 X10(3) UL (ref 0–1)
IMM GRANULOCYTES NFR BLD: 0.5 %
LYMPHOCYTES # BLD AUTO: 2 X10(3) UL (ref 1–4)
LYMPHOCYTES NFR BLD AUTO: 15.4 %
MCH RBC QN AUTO: 27.3 PG (ref 26–34)
MCHC RBC AUTO-ENTMCNC: 31.1 G/DL (ref 31–37)
MCV RBC AUTO: 87.8 FL
MONOCYTES # BLD AUTO: 0.63 X10(3) UL (ref 0.1–1)
MONOCYTES NFR BLD AUTO: 4.8 %
MULTISTIX LOT#: NORMAL NUMERIC
NEUTROPHILS # BLD AUTO: 10.23 X10 (3) UL (ref 1.5–7.7)
NEUTROPHILS # BLD AUTO: 10.23 X10(3) UL (ref 1.5–7.7)
NEUTROPHILS NFR BLD AUTO: 78.6 %
PLATELET # BLD AUTO: 378 10(3)UL (ref 150–450)
PROTEIN (URINE DIPSTICK): NEGATIVE MG/DL
RBC # BLD AUTO: 4.17 X10(6)UL
WBC # BLD AUTO: 13 X10(3) UL (ref 4–11)

## 2022-01-12 PROCEDURE — 87389 HIV-1 AG W/HIV-1&-2 AB AG IA: CPT | Performed by: OBSTETRICS & GYNECOLOGY

## 2022-01-12 PROCEDURE — 3008F BODY MASS INDEX DOCD: CPT | Performed by: OBSTETRICS & GYNECOLOGY

## 2022-01-12 PROCEDURE — 85025 COMPLETE CBC W/AUTO DIFF WBC: CPT | Performed by: OBSTETRICS & GYNECOLOGY

## 2022-01-12 PROCEDURE — 3078F DIAST BP <80 MM HG: CPT | Performed by: OBSTETRICS & GYNECOLOGY

## 2022-01-12 PROCEDURE — 81002 URINALYSIS NONAUTO W/O SCOPE: CPT | Performed by: OBSTETRICS & GYNECOLOGY

## 2022-01-12 PROCEDURE — 76816 OB US FOLLOW-UP PER FETUS: CPT | Performed by: OBSTETRICS & GYNECOLOGY

## 2022-01-12 PROCEDURE — 3074F SYST BP LT 130 MM HG: CPT | Performed by: OBSTETRICS & GYNECOLOGY

## 2022-01-12 PROCEDURE — 99212 OFFICE O/P EST SF 10 MIN: CPT | Performed by: OBSTETRICS & GYNECOLOGY

## 2022-01-12 NOTE — PROGRESS NOTES
HARINDER - 30w1d  Doing well, +FM  Denies LOF/VB/uctx  BP controlled at home. Only taking PM dose of labetalol   Growth US today:  EFW 1580 g ( 3 lb 8 oz); 55%. MVP is 4 cm .  DAYAN 10.9 cm  /70   Ht 63\"   Wt 240 lb (108.9 kg)   LMP 06/15/2021 ([employer]

## 2022-01-25 ENCOUNTER — ROUTINE PRENATAL (OUTPATIENT)
Dept: OBGYN CLINIC | Facility: CLINIC | Age: 37
End: 2022-01-25
Payer: COMMERCIAL

## 2022-01-25 ENCOUNTER — APPOINTMENT (OUTPATIENT)
Dept: OBGYN CLINIC | Facility: CLINIC | Age: 37
End: 2022-01-25
Payer: COMMERCIAL

## 2022-01-25 VITALS
BODY MASS INDEX: 42.52 KG/M2 | DIASTOLIC BLOOD PRESSURE: 72 MMHG | WEIGHT: 240 LBS | SYSTOLIC BLOOD PRESSURE: 120 MMHG | HEIGHT: 63 IN

## 2022-01-25 DIAGNOSIS — I10 CHRONIC HYPERTENSION: ICD-10-CM

## 2022-01-25 DIAGNOSIS — Z36.9 PRENATAL SCREENING ENCOUNTER: Primary | ICD-10-CM

## 2022-01-25 DIAGNOSIS — E28.2 PCOS (POLYCYSTIC OVARIAN SYNDROME): ICD-10-CM

## 2022-01-25 LAB
GLUCOSE (URINE DIPSTICK): NEGATIVE MG/DL
MULTISTIX LOT#: NORMAL NUMERIC
PROTEIN (URINE DIPSTICK): NEGATIVE MG/DL

## 2022-01-25 PROCEDURE — 81002 URINALYSIS NONAUTO W/O SCOPE: CPT | Performed by: NURSE PRACTITIONER

## 2022-01-25 PROCEDURE — 3074F SYST BP LT 130 MM HG: CPT | Performed by: NURSE PRACTITIONER

## 2022-01-25 PROCEDURE — 59025 FETAL NON-STRESS TEST: CPT | Performed by: NURSE PRACTITIONER

## 2022-01-25 PROCEDURE — 3078F DIAST BP <80 MM HG: CPT | Performed by: NURSE PRACTITIONER

## 2022-01-25 PROCEDURE — 3008F BODY MASS INDEX DOCD: CPT | Performed by: NURSE PRACTITIONER

## 2022-01-25 NOTE — PROGRESS NOTES
HARINDER  Doing well, no concerns or questions  GOOD FM  Denies VB/LOF/uctx  NST reactive  RTC in 1 week with NST  Fetal movement instructions given

## 2022-02-03 NOTE — TELEPHONE ENCOUNTER
[facility]. Please give to patient. Thank You.

## 2022-02-04 ENCOUNTER — LAB ENCOUNTER (OUTPATIENT)
Dept: LAB | Age: 37
End: 2022-02-04
Attending: NURSE PRACTITIONER
Payer: COMMERCIAL

## 2022-02-04 ENCOUNTER — ROUTINE PRENATAL (OUTPATIENT)
Dept: OBGYN CLINIC | Facility: CLINIC | Age: 37
End: 2022-02-04
Payer: COMMERCIAL

## 2022-02-04 ENCOUNTER — APPOINTMENT (OUTPATIENT)
Dept: OBGYN CLINIC | Facility: CLINIC | Age: 37
End: 2022-02-04
Payer: COMMERCIAL

## 2022-02-04 VITALS — DIASTOLIC BLOOD PRESSURE: 70 MMHG | WEIGHT: 242.38 LBS | BODY MASS INDEX: 43 KG/M2 | SYSTOLIC BLOOD PRESSURE: 132 MMHG

## 2022-02-04 DIAGNOSIS — I10 CHRONIC HYPERTENSION: ICD-10-CM

## 2022-02-04 DIAGNOSIS — O13.9 TRANSIENT HYPERTENSION OF PREGNANCY, ANTEPARTUM: ICD-10-CM

## 2022-02-04 DIAGNOSIS — Z3A.33 33 WEEKS GESTATION OF PREGNANCY: Primary | ICD-10-CM

## 2022-02-04 LAB
ALBUMIN SERPL-MCNC: 2.8 G/DL (ref 3.4–5)
ALBUMIN/GLOB SERPL: 0.7 {RATIO} (ref 1–2)
ALP LIVER SERPL-CCNC: 89 U/L
ALT SERPL-CCNC: 14 U/L
ANION GAP SERPL CALC-SCNC: 7 MMOL/L (ref 0–18)
AST SERPL-CCNC: 15 U/L (ref 15–37)
BASOPHILS # BLD AUTO: 0.03 X10(3) UL (ref 0–0.2)
BASOPHILS NFR BLD AUTO: 0.2 %
BILIRUB SERPL-MCNC: 0.3 MG/DL (ref 0.1–2)
BUN BLD-MCNC: 5 MG/DL (ref 7–18)
CALCIUM BLD-MCNC: 9.2 MG/DL (ref 8.5–10.1)
CHLORIDE SERPL-SCNC: 105 MMOL/L (ref 98–112)
CO2 SERPL-SCNC: 24 MMOL/L (ref 21–32)
CREAT BLD-MCNC: 0.45 MG/DL
CREAT UR-SCNC: 70.2 MG/DL
EOSINOPHIL NFR BLD AUTO: 0.4 %
ERYTHROCYTE [DISTWIDTH] IN BLOOD BY AUTOMATED COUNT: 15.1 %
FASTING STATUS PATIENT QL REPORTED: NO
GLOBULIN PLAS-MCNC: 3.9 G/DL (ref 2.8–4.4)
GLUCOSE (URINE DIPSTICK): NEGATIVE MG/DL
GLUCOSE BLD-MCNC: 112 MG/DL (ref 70–99)
HCT VFR BLD AUTO: 37.4 %
HGB BLD-MCNC: 11.9 G/DL
IMM GRANULOCYTES # BLD AUTO: 0.06 X10(3) UL (ref 0–1)
IMM GRANULOCYTES NFR BLD: 0.5 %
LYMPHOCYTES # BLD AUTO: 2 X10(3) UL (ref 1–4)
LYMPHOCYTES NFR BLD AUTO: 15.3 %
MCH RBC QN AUTO: 27.5 PG (ref 26–34)
MCHC RBC AUTO-ENTMCNC: 31.8 G/DL (ref 31–37)
MCV RBC AUTO: 86.6 FL
MONOCYTES # BLD AUTO: 0.59 X10(3) UL (ref 0.1–1)
MONOCYTES NFR BLD AUTO: 4.5 %
MULTISTIX LOT#: NORMAL NUMERIC
NEUTROPHILS # BLD AUTO: 10.3 X10 (3) UL (ref 1.5–7.7)
NEUTROPHILS # BLD AUTO: 10.3 X10(3) UL (ref 1.5–7.7)
NEUTROPHILS NFR BLD AUTO: 79.1 %
OSMOLALITY SERPL CALC.SUM OF ELEC: 280 MOSM/KG (ref 275–295)
PLATELET # BLD AUTO: 382 10(3)UL (ref 150–450)
POTASSIUM SERPL-SCNC: 3.8 MMOL/L (ref 3.5–5.1)
PROT SERPL-MCNC: 6.7 G/DL (ref 6.4–8.2)
PROT/CREAT UR-RTO: 0.12
RBC # BLD AUTO: 4.32 X10(6)UL
SODIUM SERPL-SCNC: 136 MMOL/L (ref 136–145)
WBC # BLD AUTO: 13 X10(3) UL (ref 4–11)

## 2022-02-04 PROCEDURE — 85025 COMPLETE CBC W/AUTO DIFF WBC: CPT | Performed by: OBSTETRICS & GYNECOLOGY

## 2022-02-04 PROCEDURE — 81002 URINALYSIS NONAUTO W/O SCOPE: CPT | Performed by: OBSTETRICS & GYNECOLOGY

## 2022-02-04 PROCEDURE — 3078F DIAST BP <80 MM HG: CPT | Performed by: OBSTETRICS & GYNECOLOGY

## 2022-02-04 PROCEDURE — 82570 ASSAY OF URINE CREATININE: CPT | Performed by: OBSTETRICS & GYNECOLOGY

## 2022-02-04 PROCEDURE — 59025 FETAL NON-STRESS TEST: CPT | Performed by: OBSTETRICS & GYNECOLOGY

## 2022-02-04 PROCEDURE — 84550 ASSAY OF BLOOD/URIC ACID: CPT | Performed by: OBSTETRICS & GYNECOLOGY

## 2022-02-04 PROCEDURE — 84156 ASSAY OF PROTEIN URINE: CPT | Performed by: OBSTETRICS & GYNECOLOGY

## 2022-02-04 PROCEDURE — 80053 COMPREHEN METABOLIC PANEL: CPT | Performed by: OBSTETRICS & GYNECOLOGY

## 2022-02-04 PROCEDURE — 3075F SYST BP GE 130 - 139MM HG: CPT | Performed by: OBSTETRICS & GYNECOLOGY

## 2022-02-05 NOTE — PROGRESS NOTES
HARINDER 33w4d    Doing ok, +FM  No contractions  No LOF, VB  Concerned re-BP as this is slightly elevated for her. Has noted some swelling. No headaches or problems with vision. Takes her labetalol at least daily but sometimes BID if Bps are slightly more elevated    1. FHT-NST reactive  2. PNL:  HIV neg  3. Mode of delivery:  anticipated,    4. CHTN in pregnancy: check preeclampsia labs, due to 30 of protein today as well. Pt reassured BP is still acceptable range. Plan delivery timing pending BP control. Reviewed warning si/sx. Continue weekly NSTs  5.  Immunizations: s/p TDAP and COVID vaccine    Return in 1 weeks

## 2022-02-09 ENCOUNTER — OFFICE VISIT (OUTPATIENT)
Dept: PERINATAL CARE | Facility: HOSPITAL | Age: 37
End: 2022-02-09
Attending: OBSTETRICS & GYNECOLOGY
Payer: COMMERCIAL

## 2022-02-09 ENCOUNTER — ROUTINE PRENATAL (OUTPATIENT)
Dept: OBGYN CLINIC | Facility: CLINIC | Age: 37
End: 2022-02-09
Payer: COMMERCIAL

## 2022-02-09 VITALS
BODY MASS INDEX: 43.59 KG/M2 | SYSTOLIC BLOOD PRESSURE: 126 MMHG | DIASTOLIC BLOOD PRESSURE: 81 MMHG | HEIGHT: 63 IN | HEART RATE: 116 BPM | WEIGHT: 246 LBS

## 2022-02-09 VITALS — SYSTOLIC BLOOD PRESSURE: 124 MMHG | DIASTOLIC BLOOD PRESSURE: 72 MMHG | BODY MASS INDEX: 43 KG/M2 | WEIGHT: 243.19 LBS

## 2022-02-09 DIAGNOSIS — I10 CHRONIC HYPERTENSION: ICD-10-CM

## 2022-02-09 DIAGNOSIS — O99.210 OBESITY AFFECTING PREGNANCY, ANTEPARTUM: ICD-10-CM

## 2022-02-09 DIAGNOSIS — E66.01 MATERNAL MORBID OBESITY, ANTEPARTUM (HCC): ICD-10-CM

## 2022-02-09 DIAGNOSIS — E66.01 MORBID OBESITY WITH BMI OF 40.0-44.9, ADULT (HCC): ICD-10-CM

## 2022-02-09 DIAGNOSIS — I10 CHRONIC HYPERTENSION: Primary | ICD-10-CM

## 2022-02-09 DIAGNOSIS — O35.9XX0 FETAL ABNORMALITY AFFECTING MANAGEMENT OF MOTHER, SINGLE OR UNSPECIFIED FETUS: ICD-10-CM

## 2022-02-09 DIAGNOSIS — Z3A.34 34 WEEKS GESTATION OF PREGNANCY: Primary | ICD-10-CM

## 2022-02-09 DIAGNOSIS — O99.210 MATERNAL MORBID OBESITY, ANTEPARTUM (HCC): ICD-10-CM

## 2022-02-09 LAB
GLUCOSE (URINE DIPSTICK): NEGATIVE MG/DL
MULTISTIX LOT#: NORMAL NUMERIC

## 2022-02-09 PROCEDURE — 3074F SYST BP LT 130 MM HG: CPT | Performed by: OBSTETRICS & GYNECOLOGY

## 2022-02-09 PROCEDURE — 76819 FETAL BIOPHYS PROFIL W/O NST: CPT

## 2022-02-09 PROCEDURE — 76819 FETAL BIOPHYS PROFIL W/O NST: CPT | Performed by: OBSTETRICS & GYNECOLOGY

## 2022-02-09 PROCEDURE — 99212 OFFICE O/P EST SF 10 MIN: CPT | Performed by: OBSTETRICS & GYNECOLOGY

## 2022-02-09 PROCEDURE — 3078F DIAST BP <80 MM HG: CPT | Performed by: OBSTETRICS & GYNECOLOGY

## 2022-02-09 PROCEDURE — 76816 OB US FOLLOW-UP PER FETUS: CPT | Performed by: OBSTETRICS & GYNECOLOGY

## 2022-02-09 PROCEDURE — 81002 URINALYSIS NONAUTO W/O SCOPE: CPT | Performed by: OBSTETRICS & GYNECOLOGY

## 2022-02-17 ENCOUNTER — APPOINTMENT (OUTPATIENT)
Dept: OBGYN CLINIC | Facility: CLINIC | Age: 37
End: 2022-02-17
Payer: COMMERCIAL

## 2022-02-17 ENCOUNTER — ROUTINE PRENATAL (OUTPATIENT)
Dept: OBGYN CLINIC | Facility: CLINIC | Age: 37
End: 2022-02-17
Payer: COMMERCIAL

## 2022-02-17 VITALS — WEIGHT: 246.38 LBS | SYSTOLIC BLOOD PRESSURE: 120 MMHG | DIASTOLIC BLOOD PRESSURE: 70 MMHG | BODY MASS INDEX: 44 KG/M2

## 2022-02-17 DIAGNOSIS — Z34.90 PRENATAL CARE, ANTEPARTUM: ICD-10-CM

## 2022-02-17 DIAGNOSIS — I10 CHRONIC HYPERTENSION: ICD-10-CM

## 2022-02-17 DIAGNOSIS — O99.210 OBESITY AFFECTING PREGNANCY, ANTEPARTUM: ICD-10-CM

## 2022-02-17 DIAGNOSIS — Z34.00 SUPERVISION OF NORMAL FIRST PREGNANCY, ANTEPARTUM: Primary | ICD-10-CM

## 2022-02-17 LAB
GLUCOSE (URINE DIPSTICK): NEGATIVE MG/DL
MULTISTIX LOT#: NORMAL NUMERIC

## 2022-02-17 PROCEDURE — 3074F SYST BP LT 130 MM HG: CPT | Performed by: OBSTETRICS & GYNECOLOGY

## 2022-02-17 PROCEDURE — 59025 FETAL NON-STRESS TEST: CPT | Performed by: OBSTETRICS & GYNECOLOGY

## 2022-02-17 PROCEDURE — 3078F DIAST BP <80 MM HG: CPT | Performed by: OBSTETRICS & GYNECOLOGY

## 2022-02-17 PROCEDURE — 81002 URINALYSIS NONAUTO W/O SCOPE: CPT | Performed by: OBSTETRICS & GYNECOLOGY

## 2022-02-17 NOTE — PROGRESS NOTES
Patient presents with:  Prenatal Care: HARINDER after NST    Routine prenatal visit without complaints. Patient denies any bleeding, leaking fluid, cramping, or regular uterine contractions. Good fetal movement. NST: see report. Reactive  Assessment/Plan:  35w2d doing well  CHtn- Weekly NST.  MFM recommending delivery 38-39 weeks. Likely recommend induction closer to 39 weeks unless Cx favorable on exam next visit. GBS next  Kick counts reviewed. Reviewed  labor signs and symptoms. Diagnoses and all orders for this visit:    Supervision of normal first pregnancy, antepartum  -     URINALYSIS NONAUTO W/O SCOPE (OB URISTIX)    Chronic hypertension  -     FETAL NON-STRESS TEST EMG ONLY 86274; Future    Obesity affecting pregnancy, antepartum  -     FETAL NON-STRESS TEST EMG ONLY 89702; Future       Return in about 1 week (around 2022) for Routine Prenatal Visit, NST, GBMONIKA.

## 2022-02-25 ENCOUNTER — ROUTINE PRENATAL (OUTPATIENT)
Dept: OBGYN CLINIC | Facility: CLINIC | Age: 37
End: 2022-02-25
Payer: COMMERCIAL

## 2022-02-25 ENCOUNTER — APPOINTMENT (OUTPATIENT)
Dept: OBGYN CLINIC | Facility: CLINIC | Age: 37
End: 2022-02-25
Payer: COMMERCIAL

## 2022-02-25 VITALS — DIASTOLIC BLOOD PRESSURE: 72 MMHG | BODY MASS INDEX: 44 KG/M2 | WEIGHT: 250.81 LBS | SYSTOLIC BLOOD PRESSURE: 126 MMHG

## 2022-02-25 DIAGNOSIS — Z36.9 ANTENATAL SCREENING ENCOUNTER: ICD-10-CM

## 2022-02-25 DIAGNOSIS — I10 CHRONIC HYPERTENSION: ICD-10-CM

## 2022-02-25 DIAGNOSIS — Z3A.36 36 WEEKS GESTATION OF PREGNANCY: Primary | ICD-10-CM

## 2022-02-25 DIAGNOSIS — O99.210 OBESITY AFFECTING PREGNANCY, ANTEPARTUM: ICD-10-CM

## 2022-02-25 PROBLEM — O32.2XX0: Status: ACTIVE | Noted: 2022-02-25

## 2022-02-25 LAB
GLUCOSE (URINE DIPSTICK): NEGATIVE MG/DL
MULTISTIX LOT#: NORMAL NUMERIC

## 2022-02-25 PROCEDURE — 3074F SYST BP LT 130 MM HG: CPT | Performed by: OBSTETRICS & GYNECOLOGY

## 2022-02-25 PROCEDURE — 87081 CULTURE SCREEN ONLY: CPT | Performed by: OBSTETRICS & GYNECOLOGY

## 2022-02-25 PROCEDURE — 87653 STREP B DNA AMP PROBE: CPT | Performed by: OBSTETRICS & GYNECOLOGY

## 2022-02-25 PROCEDURE — 81002 URINALYSIS NONAUTO W/O SCOPE: CPT | Performed by: OBSTETRICS & GYNECOLOGY

## 2022-02-25 PROCEDURE — 3078F DIAST BP <80 MM HG: CPT | Performed by: OBSTETRICS & GYNECOLOGY

## 2022-02-25 PROCEDURE — 59025 FETAL NON-STRESS TEST: CPT | Performed by: OBSTETRICS & GYNECOLOGY

## 2022-02-25 NOTE — PROGRESS NOTES
Patient presents with:  Prenatal Care: HARINDER + NST     Routine prenatal visit without complaints. Patient denies any bleeding, leaking fluid, cramping, or regular uterine contractions. Good fetal movement. SVE: cl/th/high oblique by BSUS with head in far RLQ, shoulder presentation. NST: see report. Reactive  Assessment/Plan:  36w3d doing well  GBS done  Oblique presentation- discussed possible outcomes including spontaneous conversion to vertex or transverse lie, option of version on day of induction or PCS if persistent oblique presentation. Patient only wants 1-2 kids so OK with PCS if recommended. Recheck position weekly. Obesity/CHtn- will schedule induction for 39 weeks. 3/14/22 8 pm.  Kick counts reviewed. Reviewed labor signs and symptoms. Diagnoses and all orders for this visit:    36 weeks gestation of pregnancy  -     URINALYSIS NONAUTO W/O SCOPE (OB URISTIX)    Obesity affecting pregnancy, antepartum  -     FETAL NON-STRESS TEST EMG ONLY [id number]; Future    Chronic hypertension  -     FETAL NON-STRESS TEST EMG ONLY [id number];  Future     screening encounter  -     STREP B CULTURE; Future       Return in about 1 week (around 3/4/2022) for Routine Prenatal Visit, NST.

## 2022-02-27 LAB — GROUP B STREP BY PCR FOR PCR OVT: NEGATIVE

## 2022-03-03 ENCOUNTER — ROUTINE PRENATAL (OUTPATIENT)
Dept: OBGYN CLINIC | Facility: CLINIC | Age: 37
End: 2022-03-03
Payer: COMMERCIAL

## 2022-03-03 ENCOUNTER — APPOINTMENT (OUTPATIENT)
Dept: OBGYN CLINIC | Facility: CLINIC | Age: 37
End: 2022-03-03
Payer: COMMERCIAL

## 2022-03-03 VITALS — SYSTOLIC BLOOD PRESSURE: 124 MMHG | BODY MASS INDEX: 44 KG/M2 | DIASTOLIC BLOOD PRESSURE: 90 MMHG | WEIGHT: 248 LBS

## 2022-03-03 DIAGNOSIS — I10 CHRONIC HYPERTENSION: ICD-10-CM

## 2022-03-03 DIAGNOSIS — Z3A.37 37 WEEKS GESTATION OF PREGNANCY: Primary | ICD-10-CM

## 2022-03-03 LAB
GLUCOSE (URINE DIPSTICK): NEGATIVE MG/DL
MULTISTIX LOT#: NORMAL NUMERIC

## 2022-03-03 PROCEDURE — 59025 FETAL NON-STRESS TEST: CPT | Performed by: OBSTETRICS & GYNECOLOGY

## 2022-03-03 PROCEDURE — 3080F DIAST BP >= 90 MM HG: CPT | Performed by: OBSTETRICS & GYNECOLOGY

## 2022-03-03 PROCEDURE — 81002 URINALYSIS NONAUTO W/O SCOPE: CPT | Performed by: OBSTETRICS & GYNECOLOGY

## 2022-03-03 PROCEDURE — 3074F SYST BP LT 130 MM HG: CPT | Performed by: OBSTETRICS & GYNECOLOGY

## 2022-03-03 NOTE — PROGRESS NOTES
37w2d seen for routine OB visit. Denies ctx, lof, vb. Reports good FM. Patient Active Problem List:     Irregular menses     PCOS (polycystic ovarian syndrome)     Obesity affecting pregnancy, antepartum     Chronic hypertension     Pregnancy     Abnormal glucose tolerance test (GTT)     Oblique fetal presentation, antepartum       Gen: AAOx3, NAD  Resp: breathing comfortably  Abdomen: gravid, soft, nontender  Ext: nontender, no edema    NST - baseline 140, moderate variability, +15x15 accels, no decels  [place] - quiet     Plan:  - NST reactive, continue weekly  - mild range BP today, continue to monitor at home - continue Labetalol, dc ASA  - confirmed cephalic today - keep IOL scheduled 3/14  - return precautions reviewed    RTO in 1 week(s).

## 2022-03-08 ENCOUNTER — ROUTINE PRENATAL (OUTPATIENT)
Dept: OBGYN CLINIC | Facility: CLINIC | Age: 37
End: 2022-03-08
Payer: COMMERCIAL

## 2022-03-08 ENCOUNTER — TELEPHONE (OUTPATIENT)
Dept: OBGYN CLINIC | Facility: CLINIC | Age: 37
End: 2022-03-08

## 2022-03-08 ENCOUNTER — APPOINTMENT (OUTPATIENT)
Dept: OBGYN CLINIC | Facility: CLINIC | Age: 37
End: 2022-03-08
Payer: COMMERCIAL

## 2022-03-08 ENCOUNTER — TELEPHONE (OUTPATIENT)
Dept: OBGYN UNIT | Facility: HOSPITAL | Age: 37
End: 2022-03-08

## 2022-03-08 VITALS — DIASTOLIC BLOOD PRESSURE: 74 MMHG | WEIGHT: 252.38 LBS | BODY MASS INDEX: 45 KG/M2 | SYSTOLIC BLOOD PRESSURE: 126 MMHG

## 2022-03-08 DIAGNOSIS — O99.210 OBESITY AFFECTING PREGNANCY, ANTEPARTUM: ICD-10-CM

## 2022-03-08 DIAGNOSIS — O10.919 CHRONIC HYPERTENSION AFFECTING PREGNANCY: ICD-10-CM

## 2022-03-08 DIAGNOSIS — Z3A.38 38 WEEKS GESTATION OF PREGNANCY: Primary | ICD-10-CM

## 2022-03-08 DIAGNOSIS — O32.2XX0: ICD-10-CM

## 2022-03-08 PROCEDURE — 81002 URINALYSIS NONAUTO W/O SCOPE: CPT | Performed by: OBSTETRICS & GYNECOLOGY

## 2022-03-08 PROCEDURE — 3074F SYST BP LT 130 MM HG: CPT | Performed by: OBSTETRICS & GYNECOLOGY

## 2022-03-08 PROCEDURE — 3078F DIAST BP <80 MM HG: CPT | Performed by: OBSTETRICS & GYNECOLOGY

## 2022-03-08 PROCEDURE — 59025 FETAL NON-STRESS TEST: CPT | Performed by: OBSTETRICS & GYNECOLOGY

## 2022-03-08 NOTE — PROGRESS NOTES
HARINDER    GA: 38w0d   22  1439 22  1511   BP: 144/80 126/74   Weight: 252 lb 6.4 oz (114.5 kg)        Doing well, +FM  Denies LOF/VB/uctx  Mode of delivery:  anticipated  SVE 0/0/-4   GBS negative   Fetal movement count given  Labor precautions provided   Chronic hypertension, scheduled for induction on 3/14/2022 at 8 PM.  Denies symptoms of preeclampsia. Reports BPs at home 120s over 80s. Precautions provided. Obesity, status post M consult with level 2 ultrasound. Oblique fetal presentation noted at 36 weeks. Bedside ultrasound noted for cephalic presentation. IOL on 22    NST reactive and reassuring           Note to patient and family   The [facility] makes medical notes available to patients in the interest of transparency. However, please be advised that this is a medical document. It is intended as [id number] communication. It is written and medical language may contain abbreviations or verbiage that are technical and unfamiliar. It may appear blunt or direct. Medical documents are intended to carry relevant information, facts as evident, and the clinical opinion of the practitioner.

## 2022-03-08 NOTE — TELEPHONE ENCOUNTER
Received order for Breast Pump from [facility].    Signed by Dyllan Thakur and faxed back to [address] at [phone].     Copy in plfd

## 2022-03-09 ENCOUNTER — TELEPHONE (OUTPATIENT)
Dept: OBGYN UNIT | Facility: HOSPITAL | Age: 37
End: 2022-03-09

## 2022-03-14 ENCOUNTER — HOSPITAL ENCOUNTER (INPATIENT)
Facility: HOSPITAL | Age: 37
LOS: 3 days | Discharge: HOME | End: 2022-03-17
Attending: OBSTETRICS & GYNECOLOGY | Admitting: OBSTETRICS & GYNECOLOGY
Payer: COMMERCIAL

## 2022-03-14 ENCOUNTER — TELEPHONE (OUTPATIENT)
Dept: OBGYN UNIT | Facility: HOSPITAL | Age: 37
End: 2022-03-14

## 2022-03-14 ENCOUNTER — APPOINTMENT (OUTPATIENT)
Dept: OBGYN CLINIC | Facility: HOSPITAL | Age: 37
End: 2022-03-14
Payer: COMMERCIAL

## 2022-03-14 LAB
ANTIBODY SCREEN: NEGATIVE
BASOPHILS # BLD AUTO: 0.03 X10(3) UL (ref 0–0.2)
BASOPHILS NFR BLD AUTO: 0.2 %
EOSINOPHIL NFR BLD AUTO: 0.5 %
ERYTHROCYTE [DISTWIDTH] IN BLOOD BY AUTOMATED COUNT: 15.6 %
HCT VFR BLD AUTO: 37.6 %
HGB BLD-MCNC: 13.2 G/DL
IMM GRANULOCYTES # BLD AUTO: 0.06 X10(3) UL (ref 0–1)
IMM GRANULOCYTES NFR BLD: 0.4 %
LYMPHOCYTES # BLD AUTO: 2.58 X10(3) UL (ref 1–4)
LYMPHOCYTES NFR BLD AUTO: 19.2 %
MCH RBC QN AUTO: 28.8 PG (ref 26–34)
MCHC RBC AUTO-ENTMCNC: 35.1 G/DL (ref 31–37)
MCV RBC AUTO: 81.9 FL
MONOCYTES # BLD AUTO: 0.69 X10(3) UL (ref 0.1–1)
MONOCYTES NFR BLD AUTO: 5.1 %
NEUTROPHILS # BLD AUTO: 9.99 X10(3) UL (ref 1.5–7.7)
NEUTROPHILS NFR BLD AUTO: 74.6 %
PLATELET # BLD AUTO: 370 10(3)UL (ref 150–450)
RBC # BLD AUTO: 4.59 X10(6)UL
RH BLOOD TYPE: POSITIVE
SARS-COV-2 RNA RESP QL NAA+PROBE: NOT DETECTED
T PALLIDUM AB SER QL IA: NONREACTIVE
WBC # BLD AUTO: 13.4 X10(3) UL (ref 4–11)

## 2022-03-14 PROCEDURE — 3E0P7VZ INTRODUCTION OF HORMONE INTO FEMALE REPRODUCTIVE, VIA NATURAL OR ARTIFICIAL OPENING: ICD-10-PCS | Performed by: OBSTETRICS & GYNECOLOGY

## 2022-03-14 RX ORDER — DEXTROSE, SODIUM CHLORIDE, SODIUM LACTATE, POTASSIUM CHLORIDE, AND CALCIUM CHLORIDE 5; .6; .31; .03; .02 G/100ML; G/100ML; G/100ML; G/100ML; G/100ML
INJECTION, SOLUTION INTRAVENOUS AS NEEDED
Status: DISCONTINUED | OUTPATIENT
Start: 2022-03-14 | End: 2022-03-16 | Stop reason: HOSPADM

## 2022-03-14 RX ORDER — TRISODIUM CITRATE DIHYDRATE AND CITRIC ACID MONOHYDRATE 500; 334 MG/5ML; MG/5ML
30 SOLUTION ORAL AS NEEDED
Status: DISCONTINUED | OUTPATIENT
Start: 2022-03-14 | End: 2022-03-16 | Stop reason: HOSPADM

## 2022-03-14 RX ORDER — ONDANSETRON 2 MG/ML
4 INJECTION INTRAMUSCULAR; INTRAVENOUS EVERY 6 HOURS PRN
Status: DISCONTINUED | OUTPATIENT
Start: 2022-03-14 | End: 2022-03-16 | Stop reason: HOSPADM

## 2022-03-14 RX ORDER — HYDROMORPHONE HYDROCHLORIDE 1 MG/ML
1 INJECTION, SOLUTION INTRAMUSCULAR; INTRAVENOUS; SUBCUTANEOUS EVERY 2 HOUR PRN
Status: DISCONTINUED | OUTPATIENT
Start: 2022-03-14 | End: 2022-03-16

## 2022-03-14 RX ORDER — SODIUM CHLORIDE, SODIUM LACTATE, POTASSIUM CHLORIDE, CALCIUM CHLORIDE 600; 310; 30; 20 MG/100ML; MG/100ML; MG/100ML; MG/100ML
INJECTION, SOLUTION INTRAVENOUS CONTINUOUS
Status: DISCONTINUED | OUTPATIENT
Start: 2022-03-14 | End: 2022-03-16 | Stop reason: HOSPADM

## 2022-03-14 RX ORDER — ACETAMINOPHEN 500 MG
500 TABLET ORAL EVERY 6 HOURS PRN
Status: DISCONTINUED | OUTPATIENT
Start: 2022-03-14 | End: 2022-03-16 | Stop reason: HOSPADM

## 2022-03-14 RX ORDER — TERBUTALINE SULFATE 1 MG/ML
0.25 INJECTION, SOLUTION SUBCUTANEOUS AS NEEDED
Status: DISCONTINUED | OUTPATIENT
Start: 2022-03-14 | End: 2022-03-16 | Stop reason: HOSPADM

## 2022-03-14 RX ORDER — LABETALOL 100 MG/1
100 TABLET, FILM COATED ORAL 2 TIMES DAILY
Status: DISCONTINUED | OUTPATIENT
Start: 2022-03-15 | End: 2022-03-16

## 2022-03-14 RX ORDER — AMMONIA INHALANTS 0.04 G/.3ML
0.3 INHALANT RESPIRATORY (INHALATION) AS NEEDED
Status: DISCONTINUED | OUTPATIENT
Start: 2022-03-14 | End: 2022-03-16 | Stop reason: HOSPADM

## 2022-03-14 RX ORDER — IBUPROFEN 600 MG/1
600 TABLET ORAL EVERY 6 HOURS PRN
Status: DISCONTINUED | OUTPATIENT
Start: 2022-03-14 | End: 2022-03-16 | Stop reason: HOSPADM

## 2022-03-15 ENCOUNTER — ANESTHESIA (OUTPATIENT)
Dept: OBGYN UNIT | Facility: HOSPITAL | Age: 37
End: 2022-03-15
Payer: COMMERCIAL

## 2022-03-15 ENCOUNTER — ANESTHESIA EVENT (OUTPATIENT)
Dept: OBGYN UNIT | Facility: HOSPITAL | Age: 37
End: 2022-03-15
Payer: COMMERCIAL

## 2022-03-15 PROCEDURE — 3E033VJ INTRODUCTION OF OTHER HORMONE INTO PERIPHERAL VEIN, PERCUTANEOUS APPROACH: ICD-10-PCS | Performed by: OBSTETRICS & GYNECOLOGY

## 2022-03-15 PROCEDURE — 10907ZC DRAINAGE OF AMNIOTIC FLUID, THERAPEUTIC FROM PRODUCTS OF CONCEPTION, VIA NATURAL OR ARTIFICIAL OPENING: ICD-10-PCS | Performed by: OBSTETRICS & GYNECOLOGY

## 2022-03-15 RX ORDER — LIDOCAINE HYDROCHLORIDE AND EPINEPHRINE 15; 5 MG/ML; UG/ML
INJECTION, SOLUTION EPIDURAL AS NEEDED
Status: DISCONTINUED | OUTPATIENT
Start: 2022-03-15 | End: 2022-03-16 | Stop reason: SURG

## 2022-03-15 RX ORDER — NALBUPHINE HCL 10 MG/ML
2.5 AMPUL (ML) INJECTION
Status: DISCONTINUED | OUTPATIENT
Start: 2022-03-15 | End: 2022-03-16

## 2022-03-15 RX ORDER — LIDOCAINE HYDROCHLORIDE 10 MG/ML
INJECTION, SOLUTION EPIDURAL; INFILTRATION; INTRACAUDAL; PERINEURAL AS NEEDED
Status: DISCONTINUED | OUTPATIENT
Start: 2022-03-15 | End: 2022-03-16 | Stop reason: SURG

## 2022-03-15 RX ORDER — BUPIVACAINE HCL/0.9 % NACL/PF 0.25 %
5 PLASTIC BAG, INJECTION (ML) EPIDURAL AS NEEDED
Status: DISCONTINUED | OUTPATIENT
Start: 2022-03-15 | End: 2022-03-16

## 2022-03-15 RX ADMIN — LIDOCAINE HYDROCHLORIDE AND EPINEPHRINE 5 ML: 15; 5 INJECTION, SOLUTION EPIDURAL at 15:22:00

## 2022-03-15 RX ADMIN — LIDOCAINE HYDROCHLORIDE AND EPINEPHRINE 5 ML: 15; 5 INJECTION, SOLUTION EPIDURAL at 17:54:00

## 2022-03-15 RX ADMIN — LIDOCAINE HYDROCHLORIDE 3 ML: 10 INJECTION, SOLUTION EPIDURAL; INFILTRATION; INTRACAUDAL; PERINEURAL at 17:40:00

## 2022-03-15 NOTE — PLAN OF CARE
Problem: BIRTH - VAGINAL/ SECTION  Goal: Fetal and maternal status remain reassuring during the birth process  Description: INTERVENTIONS:  - Monitor vital signs  - Monitor fetal heart rate  - Monitor uterine activity  - Monitor labor progression (vaginal delivery)  - DVT prophylaxis (C/S delivery)  - Surgical antibiotic prophylaxis (C/S delivery)  Outcome: Progressing     Problem: PAIN - ADULT  Goal: Verbalizes/displays adequate comfort level or patient's stated pain goal  Description: INTERVENTIONS:  - Encourage pt to monitor pain and request assistance  - Assess pain using appropriate pain scale  - Administer analgesics based on type and severity of pain and evaluate response  - Implement non-pharmacological measures as appropriate and evaluate response  - Consider cultural and social influences on pain and pain management  - Manage/alleviate anxiety  - Utilize distraction and/or relaxation techniques  - Monitor for opioid side effects  - Notify MD/LIP if interventions unsuccessful or patient reports new pain  - Anticipate increased pain with activity and pre-medicate as appropriate  Outcome: Progressing     Problem: ANXIETY  Goal: Will report anxiety at manageable levels  Description: INTERVENTIONS:  - Administer medication as ordered  - Teach and rehearse alternative coping skills  - Provide emotional support with 1:1 interaction with staff  Outcome: Progressing     Problem: Patient/Family Goals  Goal: Patient/Family Long Term Goal  Description: Patient's Long Term Goal: uncomplicated delivery     Interventions:    - See additional Care Plan goals for specific interventions  Outcome: Progressing  Goal: Patient/Family Short Term Goal  Description: Patient's Short Term Goal: pain control   Interventions:   -   - See additional Care Plan goals for specific interventions  Outcome: Progressing

## 2022-03-15 NOTE — PROGRESS NOTES
OB PN    S: Pt had epidural but not very comfortable can still feel blackmon    O:    03/15/22  1630   BP: 143/85   Pulse: 83   Resp:    Temp:      FHT: baseline 150, mod nai, + accels, intermittent mild variable decels  [place]: q 1-3 min  SVE: 3-4/70/-2, AROM moderate meconium stained fluid, minimal amount    A/P: 32 yo  at 39w0d IOL for CHTN  Continue pitocin per protocol  Anesthesia to re-assess epidural    Ramila Shafer MD, 03/15/22, 4:51 PM

## 2022-03-15 NOTE — PLAN OF CARE
Problem: BIRTH - VAGINAL/ SECTION  Goal: Fetal and maternal status remain reassuring during the birth process  Description: INTERVENTIONS:  - Monitor vital signs  - Monitor fetal heart rate  - Monitor uterine activity  - Monitor labor progression (vaginal delivery)  - DVT prophylaxis (C/S delivery)  - Surgical antibiotic prophylaxis (C/S delivery)  Outcome: Progressing     Problem: PAIN - ADULT  Goal: Verbalizes/displays adequate comfort level or patient's stated pain goal  Description: INTERVENTIONS:  - Encourage pt to monitor pain and request assistance  - Assess pain using appropriate pain scale  - Administer analgesics based on type and severity of pain and evaluate response  - Implement non-pharmacological measures as appropriate and evaluate response  - Consider cultural and social influences on pain and pain management  - Manage/alleviate anxiety  - Utilize distraction and/or relaxation techniques  - Monitor for opioid side effects  - Notify MD/LIP if interventions unsuccessful or patient reports new pain  - Anticipate increased pain with activity and pre-medicate as appropriate  Outcome: Progressing     Problem: ANXIETY  Goal: Will report anxiety at manageable levels  Description: INTERVENTIONS:  - Administer medication as ordered  - Teach and rehearse alternative coping skills  - Provide emotional support with 1:1 interaction with staff  Outcome: Progressing     Problem: Patient/Family Goals  Goal: Patient/Family Long Term Goal  Description: Patient's Long Term Goal: uncomplicated delivery     Interventions:    - See additional Care Plan goals for specific interventions  Outcome: Progressing  Goal: Patient/Family Short Term Goal  Description: Patient's Short Term Goal: pain control   Interventions:   -   - See additional Care Plan goals for specific interventions  Outcome: Progressing     Problem: SAFETY ADULT - FALL  Goal: Free from fall injury  Description: INTERVENTIONS:  - Assess pt frequently for physical needs  - Identify cognitive and physical deficits and behaviors that affect risk of falls.   - [place] fall precautions as indicated by assessment.  - Educate pt/family on patient safety including physical limitations  - Instruct pt to call for assistance with activity based on assessment  - Modify environment to reduce risk of injury  - Provide assistive devices as appropriate  - Consider OT/PT consult to assist with strengthening/mobility  - Encourage toileting schedule  Outcome: Progressing

## 2022-03-15 NOTE — ANESTHESIA PROCEDURE NOTES
Labor Analgesia  Performed by: Nirav Fragoso MD  Authorized by: Nirav Fragoso MD       General Information and Staff    Start Time:  3/15/2022 3:22 PM  End Time:  3/15/2022 3:22 PM  Anesthesiologist:  Nirav Fragoso MD  Performed by: Anesthesiologist  Patient Location:  OB  Site Identification: surface landmarks    Reason for Block: labor epidural    Preanesthetic Checklist: patient identified, IV checked, risks and benefits discussed, monitors and equipment checked, pre-op evaluation, timeout performed, IV bolus, anesthesia consent and sterile technique used      Procedure Details    Patient Position:  Sitting  Prep: ChloraPrep    Monitoring:  Heart rate and continuous pulse ox  Approach:  Midline    Epidural Needle    Injection Technique:   Needle Type: Tuohy  Needle Gauge:  17 G  Needle Length:  3.375 in  Location:  L2-3    Spinal Needle    Needle Type:  Sprotte tip  Needle Gauge:  24 G  Needle Length:  4.75 in    Catheter    Catheter Type:  End hole  Catheter Size:  19 G  Catheter at Skin Depth:  12  Test Dose:  Negative    Assessment      Additional Comments       1st attempt L4-5 + NORBERTO to saline. NTN 24G Sprotte +CSF, no heme, no paresthesias, no meds injected. However, catheter would not thread despite attempts to adjust or rotate the Tuohy needle. Catheter removed, tip intact. Touhy needle removed and next attempt at L3-4 midline. Pt reporting right sided back paresthesias that did not persist after needle adjustment. However, +os throughout. I attempted right paramedian approach, but again pt noting occasional right sided back paresthesias and + os throughout. Drapes taken down. Pt reprepped. New gloves and new epidural kit. Next attempt L2-3 midline.  + os throughout. Final attempt via left paramedian approach.  + NORBERTO to saline, no paresthesias encountered. Catheter placed easily to 12 cm at skin. Pt positioned RUBENS.     Jessica Prince MD  [facility]

## 2022-03-15 NOTE — PROGRESS NOTES
Pt is a 31year old female admitted to 110/110-A. Patient presents with:  Scheduled Induction: Cytotec IOL for CHTN     Pt is  38w6d intra-uterine pregnancy. History obtained, consents signed. Oriented to room, staff, and plan of care.

## 2022-03-16 ENCOUNTER — TELEPHONE (OUTPATIENT)
Dept: OBGYN CLINIC | Facility: CLINIC | Age: 37
End: 2022-03-16

## 2022-03-16 PROCEDURE — 59400 OBSTETRICAL CARE: CPT | Performed by: OBSTETRICS & GYNECOLOGY

## 2022-03-16 PROCEDURE — 0UQGXZZ REPAIR VAGINA, EXTERNAL APPROACH: ICD-10-PCS | Performed by: OBSTETRICS & GYNECOLOGY

## 2022-03-16 RX ORDER — ACETAMINOPHEN 500 MG
1000 TABLET ORAL EVERY 6 HOURS PRN
Status: DISCONTINUED | OUTPATIENT
Start: 2022-03-16 | End: 2022-03-17

## 2022-03-16 RX ORDER — LABETALOL 100 MG/1
100 TABLET, FILM COATED ORAL 2 TIMES DAILY
Status: DISCONTINUED | OUTPATIENT
Start: 2022-03-16 | End: 2022-03-16

## 2022-03-16 RX ORDER — SIMETHICONE 80 MG
80 TABLET,CHEWABLE ORAL 3 TIMES DAILY PRN
Status: DISCONTINUED | OUTPATIENT
Start: 2022-03-16 | End: 2022-03-17

## 2022-03-16 RX ORDER — LABETALOL 100 MG/1
100 TABLET, FILM COATED ORAL 2 TIMES DAILY
Status: DISCONTINUED | OUTPATIENT
Start: 2022-03-16 | End: 2022-03-17

## 2022-03-16 RX ORDER — ACETAMINOPHEN 500 MG
500 TABLET ORAL EVERY 6 HOURS PRN
Status: DISCONTINUED | OUTPATIENT
Start: 2022-03-16 | End: 2022-03-17

## 2022-03-16 RX ORDER — DOCUSATE SODIUM 100 MG/1
100 CAPSULE, LIQUID FILLED ORAL
Status: DISCONTINUED | OUTPATIENT
Start: 2022-03-16 | End: 2022-03-17

## 2022-03-16 RX ORDER — IBUPROFEN 600 MG/1
600 TABLET ORAL EVERY 6 HOURS
Status: DISCONTINUED | OUTPATIENT
Start: 2022-03-16 | End: 2022-03-17

## 2022-03-16 RX ORDER — BISACODYL 10 MG
10 SUPPOSITORY, RECTAL RECTAL ONCE AS NEEDED
Status: DISCONTINUED | OUTPATIENT
Start: 2022-03-16 | End: 2022-03-17

## 2022-03-16 RX ORDER — METOCLOPRAMIDE HYDROCHLORIDE 5 MG/ML
10 INJECTION INTRAMUSCULAR; INTRAVENOUS EVERY 6 HOURS PRN
Status: DISCONTINUED | OUTPATIENT
Start: 2022-03-16 | End: 2022-03-16

## 2022-03-16 RX ORDER — BREAST PUMP
EACH MISCELLANEOUS
Qty: 1 EACH | Refills: 0 | OUTPATIENT
Start: 2022-03-16

## 2022-03-16 NOTE — ANESTHESIA PROCEDURE NOTES
Labor Analgesia  Performed by: Munir Wood MD  Authorized by: Munir Wood MD       General Information and Staff    Start Time:  3/15/2022 5:40 PM  End Time:  3/15/2022 5:53 PM  Anesthesiologist:  Munir Wood MD  Performed by:   Anesthesiologist  Patient Location:  OB  Site Identification: surface landmarks    Reason for Block: labor epidural    Preanesthetic Checklist: patient identified, IV checked, risks and benefits discussed, monitors and equipment checked, pre-op evaluation, timeout performed, IV bolus, anesthesia consent and sterile technique used      Procedure Details    Patient Position:  Sitting  Prep: ChloraPrep    Monitoring:  Heart rate and continuous pulse ox  Approach:  Midline    Epidural Needle    Injection Technique:  NORBERTO saline  Needle Type:  Tuohy  Needle Gauge:  17 G  Needle Length:  3.375 in  Needle Insertion Depth:  7    Spinal Needle      Catheter    Catheter Type:  End hole  Catheter Size:  19 G  Catheter at Skin Depth:  11  Test Dose:  Negative    Assessment      Additional Comments

## 2022-03-16 NOTE — PROGRESS NOTES
OB PN    Comfortable with epidural. No complaints    O:    03/15/22  2148   BP: 111/60   Pulse: 88   Resp: 16   Temp:      FHT: baseline 150, mod nai, + accels, prolonged deceleration at 19:30 to 90 bpm x 3 min noted, recovered following position changes. Intermittent mild variable deceleration. Late deceleration with SVE but recovered with repositioning  Herrings: q 1-3 min  SVE: 5/90/-2    A/P: 30 yo  at [address] [facility] for CHTN  1. FHT-Cat 2, but overall reassuring without repetitive decelerations. Will continue to monitor closely, position changes PRN  2. IOL: s/p cytotec x 3, AROM, and now on pitocin starting at 10:00. Continue per protocol as tolerated by fetus  3. GBS negative  4.  CHTN: hold evening labetalol due to low BP likely from epidural    Rios Vega MD, 03/15/22, 10:19 PM

## 2022-03-16 NOTE — L&D DELIVERY NOTE
Mere Cornelius, Girl [[id number]]    Labor Events     labor?: No   steroids?: None  Cervical ripening type: Misoprostol  Antibiotics received during labor?: No  Antibiotics (enter # doses in comment): none  Rupture date/time: 3/15/2022 1635     Rupture type: AROM  Fluid color: Meconium  Induction: Misoprostol, Oxytocin  Indications for induction: Hypertension  Induction comment: Chronic Hypertension    Augmentation: None     Labor Event Times    Labor onset date/time: 3/15/2022 1300      Presentation    Presentation: Vertex  Position: Left Occiput Anterior     Operative Delivery    Operative Vaginal Delivery: No            Shoulder Dystocia    Shoulder Dystocia: Yes  Addtional staff called for assistance: Yes  Maneuver performed: Micha, Suprapubic pressure on fetal shoulder       Anesthesia    Method: Epidural   Analgesics:  Analgesics   DILAUDID 1 MG/ML IJ SOLN         [place] Delivery    Delivery date/time:  3/16/22 04:22:18   Delivery type: Normal spontaneous vaginal delivery    Details:        Delivery Providers     Delivery personnel:  Provider Role    Baby Nurse    Delivery Nurse         Cord    No data filed      Measurements    Weight: 3820 g 8 lb 6.8 oz Length: 52.1 cm   Head circum.: 33.5 cm Chest circum.: 32 cm      Abdominal circum.: 32 cm       Placenta    Date/time: 3/16/2022 0430  Removal: Spontaneous  Appearance: Intact  Disposition: Pathology     Apgars    Living status: Living   Apgar Scoring Key:    0 1 2    Skin color Blue or pale Acrocyanotic Completely pink    Heart rate Absent <100 bpm >100 bpm    Reflex irritability No response Grimace Cry or active withdrawal    Muscle tone Limp Some flexion Active motion    Respiratory effort Absent Weak cry; hypoventilation Good, crying              1 Minute:  5 Minute:  10 Minute:  15 Minute:  20 Minute:    Skin color: 0  2  2      Heart rate: 1  2  2      Reflex irritablity: 1  1  2      Muscle tone: 0  2  2 Respiratory effort: 1  1  1      Total: 3  8  9         Apgars assigned by: Cullen Blinks COVERT MD   disposition: with mother     Skin to Skin    Skin to skin initiated date/time: 3/16/2022 0450     Vaginal Count    Initial count RN: Todd Good RN  Initial count Tech: Carina Stone   Kota   Jaquelin    Initial counts 11   0    Final counts 11          Delivery (Maternal)    Episiotomy: None  Perineal lacerations: None    Vaginal laceration?: Yes Repaired?: Yes   Cervical laceration?: No    Clitoral laceration?: No Repaired?: No                                                                     Vaginal Delivery Note          [facility] Patient Status:  Inpatient    1985 MRN [id number]   Location [address] [place] [address] Attending Scott Pacheco MD   Hosp Day # 2 PCP None Pcp       Pre Op Dx:    1. 32 yo  at 39w1d Induction of labor  2. Chronic hypertension on labetalol   3. Maternal obesity    Post Op Dx:   3. 32 yo  s/p vaginal delivery complicated by shoulder dystocia    Op:  Spontaneous Vaginal Delivery complicated by shoulder dystocia    Surgeon:  Luc Verma MD       Anesthesia: epidural     ml    Indications:  See Delivery Summary    Findings:      Head: LEAH   Sex: female      Weight: 3820 grams       Apgars:  3/8/9       Nuchal: x1 reduced at perineum    Placenta: spontaneous and intact    Unique findings: as noted below      Procedure: The patient was found to be complete and began pushing with good effort at 01:40. She pushed well and brought the head to near , however, I was called into the OR to assist with a  section. She was prepped. She briefly held pushing during this time, and when I arrived back to room began pushing well. FHT were noted to be 170 baseline but with moderate variability, occasional variable decels. She had a very tight perineum and introitus.  Once the head was , a \"turtle sign\" was suspected and noted. The head was delivered at 04:21 and single nuchal was noted and reduced. The cord was noted to be thin. The shoulders then failed to deliver. She was attempted to be placed in Micha, however, her body habitus made this difficult. Suprapubic was attempted, but again her body habitus made this difficult. Room was noted posteriorly. The infant was LEAH, and the left shoulder and arm was swept medially and then the shoulders delivered spontaneously, total time of dystocia was less than 1 minute. The cord was clamped and cut by the father and infant immediately taken to the warmer for resuscitation. Neonatology was called due to shoulder dystocia. Cord gases were collected. The cord blood was sampled. IV pitocin and gentle uterine massage helped delivery of the placenta intact with 3 vessels. The small vaginal laceration was repaired using 3-0 vicryl x 2 sutures. The patient was then moved to the supine position in stable condition. Counts were correct. Complications:  Shoulder dystocia    Mother and infant in good condition. We reviewed events surrounding delivery and I advised in the future if similar EFW would recommend primary  section.  We reviewed implications of shoulder dystocia, and fetal risks including brachial plexus injury and clavicular fracture    Festus Hooks MD, 22, 5:11 AM

## 2022-03-16 NOTE — PROGRESS NOTES
Pt transferred to Mother Baby room 2193 in stable condition. Report given to [facility]. Infant transferred with mother in stable condition.

## 2022-03-17 VITALS
DIASTOLIC BLOOD PRESSURE: 66 MMHG | TEMPERATURE: 98 F | SYSTOLIC BLOOD PRESSURE: 124 MMHG | OXYGEN SATURATION: 92 % | WEIGHT: 252 LBS | BODY MASS INDEX: 44.65 KG/M2 | HEIGHT: 62.99 IN | HEART RATE: 93 BPM | RESPIRATION RATE: 18 BRPM

## 2022-03-17 LAB
BASOPHILS # BLD AUTO: 0.04 X10(3) UL (ref 0–0.2)
BASOPHILS NFR BLD AUTO: 0.3 %
EOSINOPHIL # BLD AUTO: 0.12 X10(3) UL (ref 0–0.7)
EOSINOPHIL NFR BLD AUTO: 0.8 %
ERYTHROCYTE [DISTWIDTH] IN BLOOD BY AUTOMATED COUNT: 15.5 %
HCT VFR BLD AUTO: 31.6 %
HGB BLD-MCNC: 10 G/DL
IMM GRANULOCYTES # BLD AUTO: 0.07 X10(3) UL (ref 0–1)
IMM GRANULOCYTES NFR BLD: 0.5 %
LYMPHOCYTES # BLD AUTO: 2.58 X10(3) UL (ref 1–4)
LYMPHOCYTES NFR BLD AUTO: 18 %
MCH RBC QN AUTO: 26.9 PG (ref 26–34)
MCHC RBC AUTO-ENTMCNC: 31.6 G/DL (ref 31–37)
MCV RBC AUTO: 84.9 FL
MONOCYTES # BLD AUTO: 0.79 X10(3) UL (ref 0.1–1)
MONOCYTES NFR BLD AUTO: 5.5 %
NEUTROPHILS # BLD AUTO: 10.73 X10 (3) UL (ref 1.5–7.7)
NEUTROPHILS # BLD AUTO: 10.73 X10(3) UL (ref 1.5–7.7)
NEUTROPHILS NFR BLD AUTO: 74.9 %
PLATELET # BLD AUTO: 264 10(3)UL (ref 150–450)
RBC # BLD AUTO: 3.72 X10(6)UL
WBC # BLD AUTO: 14.3 X10(3) UL (ref 4–11)

## 2022-03-17 PROCEDURE — 3E0134Z INTRODUCTION OF SERUM, TOXOID AND VACCINE INTO SUBCUTANEOUS TISSUE, PERCUTANEOUS APPROACH: ICD-10-PCS | Performed by: OBSTETRICS & GYNECOLOGY

## 2022-03-17 NOTE — PROGRESS NOTES
Baby bottlefeeding well, and mom pumping, all dc teaching reviewed earlier and all questions answered. Pt states comfortable caring for self and baby. Discharged in stable condition with family and accompanied by staff at [phone] per ambulatory.

## 2022-03-17 NOTE — PLAN OF CARE
Problem: PAIN - ADULT  Goal: Verbalizes/displays adequate comfort level or patient's stated pain goal  Description: INTERVENTIONS:  - Encourage pt to monitor pain and request assistance  - Assess pain using appropriate pain scale  - Administer analgesics based on type and severity of pain and evaluate response  - Implement non-pharmacological measures as appropriate and evaluate response  - Consider cultural and social influences on pain and pain management  - Manage/alleviate anxiety  - Utilize distraction and/or relaxation techniques  - Monitor for opioid side effects  - Notify MD/LIP if interventions unsuccessful or patient reports new pain  - Anticipate increased pain with activity and pre-medicate as appropriate  Outcome: Completed     Problem: POSTPARTUM  Goal: Long Term Goal:Experiences normal postpartum course  Description: INTERVENTIONS:  - Assess and monitor vital signs and lab values. - Assess fundus and lochia. - Provide ice/sitz baths for perineum discomfort. - Monitor healing of incision/episiotomy/laceration, and assess for signs and symptoms of infection and hematoma. - Assess bladder function and monitor for bladder distention.  - Provide/instruct/assist with pericare as needed. - Provide VTE prophylaxis as needed. - Monitor bowel function.  - Encourage ambulation and provide assistance as needed. - Assess and monitor emotional status and provide social service/psych resources as needed. - Utilize standard precautions and use personal protective equipment as indicated. Ensure aseptic care of all intravenous lines and invasive tubes/drains.  - Obtain immunization and exposure to communicable diseases history. Outcome: Completed  Goal: Optimize infant feeding at the breast  Description: INTERVENTIONS:  - Initiate breast feeding within first hour after birth. - Monitor effectiveness of current breast feeding efforts.   - Assess support systems available to mother/family.  - Identify cultural beliefs/practices regarding lactation, letdown techniques, maternal food preferences. - Assess mother's knowledge and previous experience with breast feeding.  - Provide information as needed about early infant feeding cues (e.g., rooting, lip smacking, sucking fingers/hand) versus late cue of crying.  - Discuss/demonstrate breast feeding aids (e.g., infant sling, nursing footstool/pillows, and breast pumps). - Encourage mother/other family members to express feelings/concerns, and actively listen. - Educate father/SO about benefits of breast feeding and how to manage common lactation challenges. - Recommend avoidance of specific medications or substances incompatible with breast feeding.  - Assess and monitor for signs of nipple pain/trauma. - Instruct and provide assistance with proper latch. - Review techniques for milk expression (breast pumping) and storage of breast milk. Provide pumping equipment/supplies, instructions and assistance, as needed. - Encourage rooming-in and breast feeding on demand.  - Encourage skin-to-skin contact. - Provide LC support as needed. - Assess for and manage engorgement. - Provide breast feeding education handouts and information on community breast feeding support. Outcome: Completed  Goal: Establishment of adequate milk supply with medication/procedure interruptions  Description: INTERVENTIONS:  - Review techniques for milk expression (breast pumping). - Provide pumping equipment/supplies, instructions, and assistance until it is safe to breastfeed infant. Outcome: Completed  Goal: Appropriate maternal -  bonding  Description: INTERVENTIONS:  - Assess caregiver- interactions. - Assess caregiver's emotional status and coping mechanisms. - Encourage caregiver to participate in  daily care. - Assess support systems available to mother/family.  - Provide /case management support as needed.   Outcome: Completed

## 2022-03-19 ENCOUNTER — TELEPHONE (OUTPATIENT)
Dept: OBGYN UNIT | Facility: HOSPITAL | Age: 37
End: 2022-03-19

## 2022-03-19 NOTE — PROGRESS NOTES
Reviewed self and infant care w / mom, she verbalizes understanding of instructions reviewed. Encourage to follow up w/ MDs as directed and w/ questions/concerns. Enc to go to ct. MMR, reminded not to get pg.

## 2022-03-22 RX ORDER — LABETALOL 100 MG/1
TABLET, FILM COATED ORAL
Qty: 180 TABLET | Refills: 0 | Status: SHIPPED | OUTPATIENT
Start: 2022-03-22

## 2022-03-22 NOTE — TELEPHONE ENCOUNTER
Patient discharged from hospital after delivery on 03/17/2022  Per Dr. Darlyn Quiles discharge note she should continue taking labetalol  Refilled until patient's PP visit.

## 2022-04-26 ENCOUNTER — POSTPARTUM (OUTPATIENT)
Dept: OBGYN CLINIC | Facility: CLINIC | Age: 37
End: 2022-04-26
Payer: COMMERCIAL

## 2022-04-26 VITALS
DIASTOLIC BLOOD PRESSURE: 84 MMHG | BODY MASS INDEX: 40.46 KG/M2 | WEIGHT: 228.38 LBS | SYSTOLIC BLOOD PRESSURE: 122 MMHG | HEIGHT: 63 IN

## 2022-04-26 DIAGNOSIS — Z12.4 SCREENING FOR MALIGNANT NEOPLASM OF CERVIX: Primary | ICD-10-CM

## 2022-04-26 PROCEDURE — 3074F SYST BP LT 130 MM HG: CPT | Performed by: OBSTETRICS & GYNECOLOGY

## 2022-04-26 PROCEDURE — 3008F BODY MASS INDEX DOCD: CPT | Performed by: OBSTETRICS & GYNECOLOGY

## 2022-04-26 PROCEDURE — 87624 HPV HI-RISK TYP POOLED RSLT: CPT | Performed by: OBSTETRICS & GYNECOLOGY

## 2022-04-26 PROCEDURE — 3079F DIAST BP 80-89 MM HG: CPT | Performed by: OBSTETRICS & GYNECOLOGY

## 2022-04-26 RX ORDER — METOCLOPRAMIDE 10 MG/1
10 TABLET ORAL EVERY 6 HOURS
Qty: 56 TABLET | Refills: 1 | Status: SHIPPED | OUTPATIENT
Start: 2022-04-26 | End: 2022-05-10

## 2022-04-27 LAB — HPV I/H RISK 1 DNA SPEC QL NAA+PROBE: NEGATIVE

## (undated) NOTE — MR AVS SNAPSHOT
After Visit Summary   12/15/2021    Rosario Hallman   MRN: [id number]           Visit Information     Date & Time  12/15/2021  3:00 PM Provider  Rashad Wilson  Dept.  Phone  [phone]      Your Vitals Were 2022 1:45 PM "[employer] [address] [facility]    2022 3:00 PM Alex       Imaging Scheduling Instructions     Around December 15, 2021   Imaging:   [facility] MFM UTERUS FU PER FETU TRNABD(CPT=768